# Patient Record
Sex: FEMALE | Race: WHITE | NOT HISPANIC OR LATINO
[De-identification: names, ages, dates, MRNs, and addresses within clinical notes are randomized per-mention and may not be internally consistent; named-entity substitution may affect disease eponyms.]

---

## 2017-01-16 ENCOUNTER — FORM ENCOUNTER (OUTPATIENT)
Age: 82
End: 2017-01-16

## 2017-01-17 ENCOUNTER — APPOINTMENT (OUTPATIENT)
Dept: MAMMOGRAPHY | Facility: HOSPITAL | Age: 82
End: 2017-01-17

## 2017-01-17 ENCOUNTER — OUTPATIENT (OUTPATIENT)
Dept: OUTPATIENT SERVICES | Facility: HOSPITAL | Age: 82
LOS: 1 days | End: 2017-01-17
Payer: MEDICARE

## 2017-01-17 ENCOUNTER — APPOINTMENT (OUTPATIENT)
Dept: ULTRASOUND IMAGING | Facility: HOSPITAL | Age: 82
End: 2017-01-17

## 2017-01-17 DIAGNOSIS — Z12.31 ENCOUNTER FOR SCREENING MAMMOGRAM FOR MALIGNANT NEOPLASM OF BREAST: ICD-10-CM

## 2017-01-17 DIAGNOSIS — R92.2 INCONCLUSIVE MAMMOGRAM: ICD-10-CM

## 2017-01-17 PROCEDURE — 77063 BREAST TOMOSYNTHESIS BI: CPT

## 2017-01-17 PROCEDURE — 76641 ULTRASOUND BREAST COMPLETE: CPT

## 2017-01-17 PROCEDURE — 77067 SCR MAMMO BI INCL CAD: CPT

## 2017-01-18 ENCOUNTER — OTHER (OUTPATIENT)
Age: 82
End: 2017-01-18

## 2017-01-27 ENCOUNTER — RECORD ABSTRACTING (OUTPATIENT)
Age: 82
End: 2017-01-27

## 2017-01-27 DIAGNOSIS — Z92.89 PERSONAL HISTORY OF OTHER MEDICAL TREATMENT: ICD-10-CM

## 2017-01-28 ENCOUNTER — LABORATORY RESULT (OUTPATIENT)
Age: 82
End: 2017-01-28

## 2017-02-06 ENCOUNTER — APPOINTMENT (OUTPATIENT)
Dept: HEMATOLOGY ONCOLOGY | Facility: CLINIC | Age: 82
End: 2017-02-06

## 2017-02-06 VITALS
WEIGHT: 147.31 LBS | HEART RATE: 72 BPM | SYSTOLIC BLOOD PRESSURE: 112 MMHG | TEMPERATURE: 97.3 F | BODY MASS INDEX: 25.29 KG/M2 | DIASTOLIC BLOOD PRESSURE: 60 MMHG

## 2017-02-06 DIAGNOSIS — Z12.4 ENCOUNTER FOR SCREENING FOR MALIGNANT NEOPLASM OF CERVIX: ICD-10-CM

## 2017-09-25 ENCOUNTER — TRANSCRIPTION ENCOUNTER (OUTPATIENT)
Age: 82
End: 2017-09-25

## 2018-02-03 ENCOUNTER — LABORATORY RESULT (OUTPATIENT)
Age: 83
End: 2018-02-03

## 2018-02-05 ENCOUNTER — OTHER (OUTPATIENT)
Age: 83
End: 2018-02-05

## 2018-02-08 ENCOUNTER — APPOINTMENT (OUTPATIENT)
Dept: HEMATOLOGY ONCOLOGY | Facility: CLINIC | Age: 83
End: 2018-02-08
Payer: MEDICARE

## 2018-02-08 VITALS
WEIGHT: 146 LBS | SYSTOLIC BLOOD PRESSURE: 90 MMHG | TEMPERATURE: 98.3 F | HEART RATE: 76 BPM | BODY MASS INDEX: 25.06 KG/M2 | DIASTOLIC BLOOD PRESSURE: 58 MMHG

## 2018-02-08 DIAGNOSIS — C43.9 MALIGNANT MELANOMA OF SKIN, UNSPECIFIED: ICD-10-CM

## 2018-02-08 DIAGNOSIS — E83.52 HYPERCALCEMIA: ICD-10-CM

## 2018-02-08 PROCEDURE — 99214 OFFICE O/P EST MOD 30 MIN: CPT

## 2018-02-08 RX ORDER — PANTOPRAZOLE 40 MG/1
40 TABLET, DELAYED RELEASE ORAL
Qty: 90 | Refills: 0 | Status: ACTIVE | COMMUNITY
Start: 2018-01-05

## 2018-02-12 ENCOUNTER — OTHER (OUTPATIENT)
Age: 83
End: 2018-02-12

## 2018-04-25 ENCOUNTER — FORM ENCOUNTER (OUTPATIENT)
Age: 83
End: 2018-04-25

## 2018-04-26 ENCOUNTER — APPOINTMENT (OUTPATIENT)
Dept: MAMMOGRAPHY | Facility: HOSPITAL | Age: 83
End: 2018-04-26
Payer: MEDICARE

## 2018-04-26 ENCOUNTER — APPOINTMENT (OUTPATIENT)
Dept: ULTRASOUND IMAGING | Facility: HOSPITAL | Age: 83
End: 2018-04-26
Payer: MEDICARE

## 2018-04-26 ENCOUNTER — OUTPATIENT (OUTPATIENT)
Dept: OUTPATIENT SERVICES | Facility: HOSPITAL | Age: 83
LOS: 1 days | End: 2018-04-26
Payer: MEDICARE

## 2018-04-26 DIAGNOSIS — Z00.8 ENCOUNTER FOR OTHER GENERAL EXAMINATION: ICD-10-CM

## 2018-04-26 PROCEDURE — 77067 SCR MAMMO BI INCL CAD: CPT

## 2018-04-26 PROCEDURE — 76641 ULTRASOUND BREAST COMPLETE: CPT | Mod: 26

## 2018-04-26 PROCEDURE — 77067 SCR MAMMO BI INCL CAD: CPT | Mod: 26

## 2018-04-26 PROCEDURE — 77063 BREAST TOMOSYNTHESIS BI: CPT | Mod: 26

## 2018-04-26 PROCEDURE — 77063 BREAST TOMOSYNTHESIS BI: CPT

## 2018-04-26 PROCEDURE — 76641 ULTRASOUND BREAST COMPLETE: CPT

## 2018-04-30 DIAGNOSIS — R92.2 INCONCLUSIVE MAMMOGRAM: ICD-10-CM

## 2018-04-30 DIAGNOSIS — R92.1 MAMMOGRAPHIC CALCIFICATION FOUND ON DIAGNOSTIC IMAGING OF BREAST: ICD-10-CM

## 2018-04-30 DIAGNOSIS — Z12.31 ENCOUNTER FOR SCREENING MAMMOGRAM FOR MALIGNANT NEOPLASM OF BREAST: ICD-10-CM

## 2018-08-17 ENCOUNTER — OUTPATIENT (OUTPATIENT)
Dept: OUTPATIENT SERVICES | Facility: HOSPITAL | Age: 83
LOS: 1 days | End: 2018-08-17
Payer: MEDICARE

## 2018-08-17 ENCOUNTER — APPOINTMENT (OUTPATIENT)
Dept: RADIOLOGY | Facility: HOSPITAL | Age: 83
End: 2018-08-17

## 2018-08-17 DIAGNOSIS — Z00.8 ENCOUNTER FOR OTHER GENERAL EXAMINATION: ICD-10-CM

## 2018-08-17 PROCEDURE — 73070 X-RAY EXAM OF ELBOW: CPT

## 2018-08-17 PROCEDURE — 73070 X-RAY EXAM OF ELBOW: CPT | Mod: 26,RT

## 2018-08-17 PROCEDURE — 73030 X-RAY EXAM OF SHOULDER: CPT | Mod: 26,RT

## 2018-08-17 PROCEDURE — 73030 X-RAY EXAM OF SHOULDER: CPT

## 2018-08-21 ENCOUNTER — APPOINTMENT (OUTPATIENT)
Dept: ORTHOPEDIC SURGERY | Facility: CLINIC | Age: 83
End: 2018-08-21
Payer: MEDICARE

## 2018-08-21 VITALS — HEIGHT: 64 IN | WEIGHT: 146 LBS | BODY MASS INDEX: 24.92 KG/M2

## 2018-08-21 DIAGNOSIS — M25.819 OTHER SPECIFIED JOINT DISORDERS, UNSPECIFIED SHOULDER: ICD-10-CM

## 2018-08-21 DIAGNOSIS — M65.841 OTHER SYNOVITIS AND TENOSYNOVITIS, RIGHT HAND: ICD-10-CM

## 2018-08-21 PROCEDURE — 99214 OFFICE O/P EST MOD 30 MIN: CPT | Mod: 25

## 2018-08-21 PROCEDURE — 20610 DRAIN/INJ JOINT/BURSA W/O US: CPT | Mod: RT

## 2018-08-22 PROBLEM — M65.841 EXTENSOR TENOSYNOVITIS OF RIGHT WRIST: Status: ACTIVE | Noted: 2018-08-22

## 2018-08-22 PROBLEM — M25.819 SUBCORACOID IMPINGEMENT OF RIGHT SHOULDER: Status: ACTIVE | Noted: 2018-08-22

## 2019-03-25 ENCOUNTER — APPOINTMENT (OUTPATIENT)
Dept: HEMATOLOGY ONCOLOGY | Facility: CLINIC | Age: 84
End: 2019-03-25

## 2019-05-22 ENCOUNTER — APPOINTMENT (OUTPATIENT)
Dept: ULTRASOUND IMAGING | Facility: HOSPITAL | Age: 84
End: 2019-05-22

## 2019-06-06 ENCOUNTER — OUTPATIENT (OUTPATIENT)
Dept: OUTPATIENT SERVICES | Facility: HOSPITAL | Age: 84
LOS: 1 days | End: 2019-06-06
Payer: MEDICARE

## 2019-06-06 ENCOUNTER — APPOINTMENT (OUTPATIENT)
Dept: ULTRASOUND IMAGING | Facility: HOSPITAL | Age: 84
End: 2019-06-06
Payer: MEDICARE

## 2019-06-06 ENCOUNTER — APPOINTMENT (OUTPATIENT)
Dept: MAMMOGRAPHY | Facility: HOSPITAL | Age: 84
End: 2019-06-06
Payer: MEDICARE

## 2019-06-06 DIAGNOSIS — Z00.8 ENCOUNTER FOR OTHER GENERAL EXAMINATION: ICD-10-CM

## 2019-06-06 PROCEDURE — 76641 ULTRASOUND BREAST COMPLETE: CPT | Mod: 26,50

## 2019-06-06 PROCEDURE — 77067 SCR MAMMO BI INCL CAD: CPT | Mod: 26

## 2019-06-06 PROCEDURE — 77067 SCR MAMMO BI INCL CAD: CPT

## 2019-06-06 PROCEDURE — 76641 ULTRASOUND BREAST COMPLETE: CPT

## 2019-06-06 PROCEDURE — 77063 BREAST TOMOSYNTHESIS BI: CPT | Mod: 26

## 2019-06-06 PROCEDURE — 77063 BREAST TOMOSYNTHESIS BI: CPT

## 2019-06-25 ENCOUNTER — EMERGENCY (EMERGENCY)
Facility: HOSPITAL | Age: 84
LOS: 1 days | Discharge: HOME | End: 2019-06-25
Attending: EMERGENCY MEDICINE | Admitting: EMERGENCY MEDICINE
Payer: MEDICARE

## 2019-06-25 VITALS
HEIGHT: 64 IN | TEMPERATURE: 98 F | OXYGEN SATURATION: 96 % | WEIGHT: 145.06 LBS | HEART RATE: 82 BPM | DIASTOLIC BLOOD PRESSURE: 88 MMHG | SYSTOLIC BLOOD PRESSURE: 165 MMHG | RESPIRATION RATE: 16 BRPM

## 2019-06-25 PROCEDURE — 99283 EMERGENCY DEPT VISIT LOW MDM: CPT

## 2019-06-25 RX ORDER — METOPROLOL TARTRATE 50 MG
25 TABLET ORAL ONCE
Refills: 0 | Status: DISCONTINUED | OUTPATIENT
Start: 2019-06-25 | End: 2019-06-25

## 2019-06-25 NOTE — ED ADULT NURSE REASSESSMENT NOTE - NS ED NURSE REASSESS COMMENT FT1
Pt states she "forgot to take my Blood Pressure medication this morning that's why my pressure is a little high but I will take it when I get home" Pt refuses to take toprol xl at this time.

## 2019-06-25 NOTE — ED PROVIDER NOTE - OBJECTIVE STATEMENT
Patient presents with report of burning sensation with poss rash to right shoulder and upper back, to where she applied a CBD coconut oil Patient presents with report of burning sensation with poss rash to right shoulder and upper back, to where she applied a CBD coconut oil with hemp extract. She has used it once before without a reaction.   She wiped off the substance when she noted the feeling of a reaction and took 1 tab of benadryl. She is improved.   Notes that she did not take her BP medication this morning.   No chest pain, sob, vomiting, throat tightness or wheeze.    She has calcific tendonitis and usually gets intraarticular or trigger pt injections by ortho. Last one was 1 year ago by  Dr Garay. The drugist at the pharmacy advised her to try this oil.

## 2019-06-25 NOTE — ED PROVIDER NOTE - CARE PROVIDER_API CALL
Marco Smith (DO)  Internal Medicine  [address], [address]  [place], [place] [id number]  Phone: [phone]  Fax: [phone]  Follow Up Time:

## 2019-06-25 NOTE — ED ADULT NURSE NOTE - CHPI ED NUR SYMPTOMS NEG
no wheezing/no difficulty breathing/no rash/no nausea/no shortness of breath/no swelling of face, tongue/no difficulty swallowing/no vomiting/no throat itching/no congestion

## 2019-06-25 NOTE — ED ADULT NURSE NOTE - OBJECTIVE STATEMENT
Pt brought to ER with complaints of "Burning after I massaged CBD oil on my shoulder, It didn't happen the last time I used it so I became nervous".

## 2019-06-25 NOTE — ED PROVIDER NOTE - CLINICAL SUMMARY MEDICAL DECISION MAKING FREE TEXT BOX
Pt reportedly had an allergic reaction. No findings in the ED. Pt understands and will f/u or return prn concerns.

## 2019-08-14 PROBLEM — I10 ESSENTIAL (PRIMARY) HYPERTENSION: Chronic | Status: ACTIVE | Noted: 2019-06-25

## 2019-08-27 ENCOUNTER — APPOINTMENT (OUTPATIENT)
Dept: FAMILY MEDICINE | Facility: CLINIC | Age: 84
End: 2019-08-27

## 2019-10-31 ENCOUNTER — TRANSCRIPTION ENCOUNTER (OUTPATIENT)
Age: 84
End: 2019-10-31

## 2019-11-12 ENCOUNTER — APPOINTMENT (OUTPATIENT)
Dept: FAMILY MEDICINE | Facility: CLINIC | Age: 84
End: 2019-11-12

## 2020-07-30 ENCOUNTER — APPOINTMENT (OUTPATIENT)
Dept: RADIOLOGY | Facility: HOSPITAL | Age: 85
End: 2020-07-30

## 2020-08-03 ENCOUNTER — EMERGENCY (EMERGENCY)
Facility: HOSPITAL | Age: 85
LOS: 1 days | Discharge: HOME | End: 2020-08-03
Attending: EMERGENCY MEDICINE | Admitting: EMERGENCY MEDICINE
Payer: MEDICARE

## 2020-08-03 VITALS
HEART RATE: 88 BPM | DIASTOLIC BLOOD PRESSURE: 89 MMHG | TEMPERATURE: 98 F | HEIGHT: 64 IN | OXYGEN SATURATION: 96 % | SYSTOLIC BLOOD PRESSURE: 163 MMHG | WEIGHT: 139.99 LBS | RESPIRATION RATE: 17 BRPM

## 2020-08-03 DIAGNOSIS — R04.0 EPISTAXIS: ICD-10-CM

## 2020-08-03 PROCEDURE — 99283 EMERGENCY DEPT VISIT LOW MDM: CPT

## 2020-08-03 PROCEDURE — 99282 EMERGENCY DEPT VISIT SF MDM: CPT

## 2020-08-03 NOTE — ED PROVIDER NOTE - NSFOLLOWUPINSTRUCTIONS_ED_ALL_ED_FT
Follow up with ENT as scheduled for tomorrow.   Return to the ED if any worsening or persistent symptoms.       Nosebleed    WHAT YOU NEED TO KNOW:    A nosebleed, or epistaxis, occurs when one or more of the blood vessels in your nose break. You may have dark or bright red blood from one or both nostrils. A nosebleed is most commonly caused by dry air or picking your nose. A direct blow to your nose, irritation from a cold or allergies, or a foreign object can also cause a nosebleed.     DISCHARGE INSTRUCTIONS:    Return to the emergency department if:     Your nasal packing is soaked with blood.      Your nose is still bleeding after 20 minutes, even after you pinch it.       You have a foul-smelling discharge coming out of your nose.      You feel so weak and dizzy that you have trouble standing up.      You have trouble breathing or talking.     Contact your healthcare provider if:     You have a fever and are vomiting.      You have pain in and around your nose that is getting worse even after you take pain medicines.      Your nasal pack is loose.      You have questions or concerns about your condition or care.    First aid:     Sit up and lean forward. This will help prevent you from swallowing blood. Spit blood and saliva into a bowl.       Apply pressure to your nose. Use 2 fingers to pinch your nose shut for 10       Apply ice on the bridge of your nose to decrease swelling and bleeding. Use a cold pack or put crushed ice in a plastic bag. Cover it with a towel to protect your skin.      Pack your nose with a cotton ball, tissue, tampon, or gauze bandage to stop the bleeding.    Medicines:     Medicines applied to a small piece of cotton and placed in your nose. Medicine may also be sprayed in or applied directly to your nose. You may need medicine to prevent an infection. If bleeding is severe, medicine may be injected into a blood vessel in your nose.       Take your medicine as directed. Contact your healthcare provider if you think your medicine is not helping or if you have side effects. Tell him of her if you are allergic to any medicine. Keep a list of the medicines, vitamins, and herbs you take. Include the amounts, and when and why you take them. Bring the list or the pill bottles to follow-up visits. Carry your medicine list with you in case of an emergency.    Prevent another nosebleed:     Keep your nose moist. Put a small amount of petroleum jelly inside your nostrils as needed. Use a saline (saltwater) nasal spray. Do not put anything else inside your nose unless your healthcare provider says it is okay. Do not use oil-based lubricants if you use oxygen therapy. They may be flammable.      Use a cool mist humidifier to increase air moisture in your home. This will help your nose stay moist.       Do not pick or blow your nose for at least a week. You can irritate or damage your nose if you pick it. Blowing your nose too hard may cause the bleeding to start again. Do not bend over or strain as this can cause the bleeding to start again.      Avoid irritants such as tobacco smoke or chemical sprays such as .    Follow up with your healthcare provider as directed: Any packing in your nose should be removed within 2 to 3 days. Write down your questions so you remember to ask them during your visits.

## 2020-08-03 NOTE — ED ADULT NURSE NOTE - OBJECTIVE STATEMENT
Pt w/ nosebleeds x3 days, each day lasting 4-5 minutes, resolves with pressure application. Pt takes motrin x2 weeks for chronic knee pain and aspirin 81mg daily preventatively. PMH knee surgery, melanoma removal. Allergy to sulfa drugs.

## 2020-08-03 NOTE — ED PROVIDER NOTE - ATTENDING CONTRIBUTION TO CARE
pt c/o nosebleed tonight from both nostrils, mostly from L, spontaneous. stopped after 4 mins pressure. had 2 other brief episodes in last 2 days. no trauma. no dizziness, cp,sob. no rhinorrhea, fever. no other abnormal bleeding.     exam:   General: well appearing, NAD.   HEENT: eyes perrl, nose: no active bleeding. no hematoma, mild edematous turbinate L nasal cavity. no definite bleeding source identified.  cor: RRR, s1s2, 2+rad pulses.   lungs: ctabl, no resp distress.   neuro: a&ox3, cn2-12 intact, CAST, 5/5 strength c nl sensation all extremities, nl coordination.   MSK: no extremity swelling.  Skin: normal, no rash    AP: epistaxis, 3 brief episodes. HDS. no s/s signif blood loss. counseled on treatment, care for rebleed. has f/u c ENT

## 2020-08-03 NOTE — ED PROVIDER NOTE - CLINICAL SUMMARY MEDICAL DECISION MAKING FREE TEXT BOX
86 yr old female with hx of HTN presents with nose bleed starting at 7 pm tonight, lasting for 4 minutes. Pt reports 2 more episodes in the last 2 days. Pt denies any chest pain, sob, dizziness, or any other symptoms. Pt takes asa daily but did not take it today. Pt also reports taking motrin daily for the last 2 weeks for arthritic knee pain. Pt has appointment with ENT tomorrow.    on exam- no bleeding noted. lungs clear. normal exam   pt stable for dc and fu with ENT tomorrow as scheduled.

## 2020-08-03 NOTE — ED PROVIDER NOTE - OBJECTIVE STATEMENT
86 yr old female with hx of HTN presents with nose bleed starting at 7 pm tonight, lasting for 4 minutes. Pt reports 2 more episodes in the last 2 days. Pt denies any chest pain, sob, dizziness, or any other symptoms. Pt takes asa daily but did not take it today. Pt also reports taking motrin daily for the last 2 weeks for arthritic knee pain. Pt has appointment with ENT tomorrow.

## 2020-08-03 NOTE — ED PROVIDER NOTE - PATIENT PORTAL LINK FT
You can access the FollowMyHealth Patient Portal offered by [facility] by registering at the following website: [email or web address]. By joining [employer]’s FollowMyHealth portal, you will also be able to view your health information using other applications (apps) compatible with our system.

## 2020-08-06 ENCOUNTER — EMERGENCY (EMERGENCY)
Facility: HOSPITAL | Age: 85
LOS: 1 days | Discharge: HOME | End: 2020-08-06
Attending: INTERNAL MEDICINE | Admitting: INTERNAL MEDICINE
Payer: MEDICARE

## 2020-08-06 VITALS
SYSTOLIC BLOOD PRESSURE: 160 MMHG | OXYGEN SATURATION: 98 % | HEIGHT: 64 IN | HEART RATE: 95 BPM | RESPIRATION RATE: 15 BRPM | DIASTOLIC BLOOD PRESSURE: 92 MMHG | TEMPERATURE: 99 F | WEIGHT: 145.06 LBS

## 2020-08-06 VITALS
RESPIRATION RATE: 16 BRPM | HEART RATE: 88 BPM | OXYGEN SATURATION: 98 % | DIASTOLIC BLOOD PRESSURE: 79 MMHG | SYSTOLIC BLOOD PRESSURE: 142 MMHG

## 2020-08-06 DIAGNOSIS — R04.0 EPISTAXIS: ICD-10-CM

## 2020-08-06 PROCEDURE — 99282 EMERGENCY DEPT VISIT SF MDM: CPT

## 2020-08-06 PROCEDURE — 99283 EMERGENCY DEPT VISIT LOW MDM: CPT

## 2020-08-06 RX ORDER — OXYMETAZOLINE HYDROCHLORIDE 0.5 MG/ML
1 SPRAY NASAL ONCE
Refills: 0 | Status: COMPLETED | OUTPATIENT
Start: 2020-08-06 | End: 2020-08-06

## 2020-08-06 RX ADMIN — OXYMETAZOLINE HYDROCHLORIDE 1 SPRAY(S): 0.5 SPRAY NASAL at 18:55

## 2020-08-06 NOTE — ED PROVIDER NOTE - OBJECTIVE STATEMENT
87 y/o F seen by Dr. cabrera (ENT) today and had left nostril cauterized with packing returns to ED for "dripping pink blood" 87 y/o F with h/o HTN seen by Dr. Matthews (ENT) today and had left nostril cauterized with packing returns to ED for "dripping pink blood". Was told to remove packing tonight before bed. Patient states she is nervous to do this by herself. Denies headache, fever, chills, nausea, vomiting. No active bleeding on exam.

## 2020-08-06 NOTE — ED PROVIDER NOTE - ATTENDING CONTRIBUTION TO CARE
85 y/o F with h/o HTN seen by Dr. Matthews (ENT) today and had left nostril cauterized with packing returns to ED for "dripping pink blood". Was told to remove packing tonight before bed. Patient states she is nervous to do this by herself. Denies headache, fever, chills, nausea, vomiting. Mild light spotting noted within nose once packing removed- Afrin/surgicel placed. ENT follow up  Dr. Pandya:  I have reviewed and discussed with the PA/ resident the case specifics, including the history, physical assessment, evaluation, conclusion, laboratory results, and medical plan. I agree with the contents, and conclusions. I have personally examined, and interviewed the patient.

## 2020-08-06 NOTE — ED PROVIDER NOTE - PATIENT PORTAL LINK FT
You can access the FollowMyHealth Patient Portal offered by [facility] by registering at the following website: [email or web address]. By joining [employer]’s FollowMyHealth portal, you will also be able to view your health information using other applications (apps) compatible with our system.

## 2020-08-06 NOTE — ED PROVIDER NOTE - CLINICAL SUMMARY MEDICAL DECISION MAKING FREE TEXT BOX
87 y/o F with h/o HTN seen by Dr. Matthews (ENT) today and had left nostril cauterized with packing returns to ED for "dripping pink blood". Was told to remove packing tonight before bed. Patient states she is nervous to do this by herself. Denies headache, fever, chills, nausea, vomiting. Mild light spotting noted within nose once packing removed- Afrin/surgicel placed. ENT follow up

## 2020-08-06 NOTE — ED PROVIDER NOTE - NSFOLLOWUPINSTRUCTIONS_ED_ALL_ED_FT
Follow up with your ENT within the week.  Take all of your medications as previously prescribed   Take Tylenol 650mg every 4-6 hours as needed for pain   No nasal blowing.   Worsening, continued or ANY new concerning symptoms return to the emergency department.     Nosebleed     A nosebleed is when blood comes out of the nose. Nosebleeds are common. They are usually not a sign of a serious medical problem.  Follow these instructions at home:  When you have a nosebleed:     Sit down.Tilt your head a little forward.Follow these steps:  Pinch your nose with a clean towel or tissue.Keep pinching your nose for 10 minutes. Do not let go.After 10 minutes, let go of your nose.If there is still bleeding, do these steps again. Keep doing these steps until the bleeding stops.Do not put things in your nose to stop the bleeding.Try not to lie down or put your head back.Use a nose spray decongestant as told by your doctor.Do not use petroleum jelly or mineral oil in your nose. These things can get into your lungs.After a nosebleed:     Try not to blow your nose or sniffle for several hours.Try not to strain, lift, or bend at the waist for several days.Use saline spray or a humidifier as told by your doctor.Aspirin and blood-thinning medicines make bleeding more likely. If you take these medicines, ask your doctor if you should stop taking them, or if you should change how much you take. Do not stop taking the medicine unless your doctor tells you to.Contact a doctor if:  You have a fever.You get nosebleeds often.You are getting nosebleeds more often than usual.You bruise very easily.You have something stuck in your nose.You have bleeding in your mouth.You throw up (vomit) or cough up brown material.You get a nosebleed after you start a new medicine.Get help right away if:  You have a nosebleed after you fall or hurt your head.Your nosebleed does not go away after 20 minutes.You feel dizzy or weak.You have unusual bleeding from other parts of your body.You have unusual bruising on other parts of your body.You get sweaty.You throw up blood.Summary  Nosebleeds are common. They are usually not a sign of a serious medical problem.When you have a nosebleed, sit down and tilt your head a little forward. Pinch your nose with a clean tissue.After the bleeding stops, try not to blow your nose or sniffle for several hours. Follow up with your ENT within the week.  The Surgicel will dissolve.   No nasal blowing.   Take all of your medications as previously prescribed   Take Tylenol 650mg every 4-6 hours as needed for pain   Worsening, continued or ANY new concerning symptoms return to the emergency department.     Nosebleed     A nosebleed is when blood comes out of the nose. Nosebleeds are common. They are usually not a sign of a serious medical problem.  Follow these instructions at home:  When you have a nosebleed:     Sit down.Tilt your head a little forward.Follow these steps:  Pinch your nose with a clean towel or tissue.Keep pinching your nose for 10 minutes. Do not let go.After 10 minutes, let go of your nose.If there is still bleeding, do these steps again. Keep doing these steps until the bleeding stops.Do not put things in your nose to stop the bleeding.Try not to lie down or put your head back.Use a nose spray decongestant as told by your doctor.Do not use petroleum jelly or mineral oil in your nose. These things can get into your lungs.After a nosebleed:     Try not to blow your nose or sniffle for several hours.Try not to strain, lift, or bend at the waist for several days.Use saline spray or a humidifier as told by your doctor.Aspirin and blood-thinning medicines make bleeding more likely. If you take these medicines, ask your doctor if you should stop taking them, or if you should change how much you take. Do not stop taking the medicine unless your doctor tells you to.Contact a doctor if:  You have a fever.You get nosebleeds often.You are getting nosebleeds more often than usual.You bruise very easily.You have something stuck in your nose.You have bleeding in your mouth.You throw up (vomit) or cough up brown material.You get a nosebleed after you start a new medicine.Get help right away if:  You have a nosebleed after you fall or hurt your head.Your nosebleed does not go away after 20 minutes.You feel dizzy or weak.You have unusual bleeding from other parts of your body.You have unusual bruising on other parts of your body.You get sweaty.You throw up blood.Summary  Nosebleeds are common. They are usually not a sign of a serious medical problem.When you have a nosebleed, sit down and tilt your head a little forward. Pinch your nose with a clean tissue.After the bleeding stops, try not to blow your nose or sniffle for several hours.

## 2020-09-13 ENCOUNTER — EMERGENCY (EMERGENCY)
Facility: HOSPITAL | Age: 85
LOS: 1 days | Discharge: HOME | End: 2020-09-13
Attending: EMERGENCY MEDICINE | Admitting: EMERGENCY MEDICINE
Payer: MEDICARE

## 2020-09-13 VITALS
HEIGHT: 64 IN | WEIGHT: 138.01 LBS | OXYGEN SATURATION: 99 % | TEMPERATURE: 98 F | RESPIRATION RATE: 17 BRPM | DIASTOLIC BLOOD PRESSURE: 100 MMHG | HEART RATE: 83 BPM | SYSTOLIC BLOOD PRESSURE: 174 MMHG

## 2020-09-13 DIAGNOSIS — R04.0 EPISTAXIS: ICD-10-CM

## 2020-09-13 DIAGNOSIS — F43.22 ADJUSTMENT DISORDER WITH ANXIETY: ICD-10-CM

## 2020-09-13 LAB
ALBUMIN SERPL ELPH-MCNC: 3.9 G/DL — SIGNIFICANT CHANGE UP (ref 3.3–5)
ALP SERPL-CCNC: 57 U/L — SIGNIFICANT CHANGE UP (ref 40–120)
ALT FLD-CCNC: 26 U/L — SIGNIFICANT CHANGE UP (ref 10–45)
AMPHET UR-MCNC: NEGATIVE — SIGNIFICANT CHANGE UP
ANION GAP SERPL CALC-SCNC: 8 MMOL/L — SIGNIFICANT CHANGE UP (ref 5–17)
APAP SERPL-MCNC: <1 UG/ML — LOW (ref 10–30)
APPEARANCE UR: CLEAR — SIGNIFICANT CHANGE UP
AST SERPL-CCNC: 19 U/L — SIGNIFICANT CHANGE UP (ref 10–40)
BACTERIA # UR AUTO: ABNORMAL /HPF
BARBITURATES UR SCN-MCNC: NEGATIVE — SIGNIFICANT CHANGE UP
BASOPHILS # BLD AUTO: 0.03 K/UL — SIGNIFICANT CHANGE UP (ref 0–0.2)
BASOPHILS NFR BLD AUTO: 0.5 % — SIGNIFICANT CHANGE UP (ref 0–2)
BENZODIAZ UR-MCNC: NEGATIVE — SIGNIFICANT CHANGE UP
BILIRUB SERPL-MCNC: 0.5 MG/DL — SIGNIFICANT CHANGE UP (ref 0.2–1.2)
BILIRUB UR-MCNC: NEGATIVE — SIGNIFICANT CHANGE UP
BUN SERPL-MCNC: 7 MG/DL — SIGNIFICANT CHANGE UP (ref 7–23)
CALCIUM SERPL-MCNC: 10.2 MG/DL — SIGNIFICANT CHANGE UP (ref 8.4–10.5)
CHLORIDE SERPL-SCNC: 99 MMOL/L — SIGNIFICANT CHANGE UP (ref 96–108)
CO2 SERPL-SCNC: 29 MMOL/L — SIGNIFICANT CHANGE UP (ref 22–31)
COCAINE METAB.OTHER UR-MCNC: NEGATIVE — SIGNIFICANT CHANGE UP
COLOR SPEC: YELLOW — SIGNIFICANT CHANGE UP
CREAT SERPL-MCNC: 0.58 MG/DL — SIGNIFICANT CHANGE UP (ref 0.5–1.3)
DIFF PNL FLD: ABNORMAL
EOSINOPHIL # BLD AUTO: 0.04 K/UL — SIGNIFICANT CHANGE UP (ref 0–0.5)
EOSINOPHIL NFR BLD AUTO: 0.6 % — SIGNIFICANT CHANGE UP (ref 0–6)
EPI CELLS # UR: SIGNIFICANT CHANGE UP
ETHANOL SERPL-MCNC: <3 MG/DL — SIGNIFICANT CHANGE UP (ref 0–3)
GLUCOSE SERPL-MCNC: 109 MG/DL — HIGH (ref 70–99)
GLUCOSE UR QL: NEGATIVE — SIGNIFICANT CHANGE UP
HCT VFR BLD CALC: 37.6 % — SIGNIFICANT CHANGE UP (ref 34.5–45)
HGB BLD-MCNC: 12.6 G/DL — SIGNIFICANT CHANGE UP (ref 11.5–15.5)
IMM GRANULOCYTES NFR BLD AUTO: 0.2 % — SIGNIFICANT CHANGE UP (ref 0–1.5)
KETONES UR-MCNC: NEGATIVE — SIGNIFICANT CHANGE UP
LEUKOCYTE ESTERASE UR-ACNC: ABNORMAL
LYMPHOCYTES # BLD AUTO: 1.52 K/UL — SIGNIFICANT CHANGE UP (ref 1–3.3)
LYMPHOCYTES # BLD AUTO: 24.7 % — SIGNIFICANT CHANGE UP (ref 13–44)
MCHC RBC-ENTMCNC: 29 PG — SIGNIFICANT CHANGE UP (ref 27–34)
MCHC RBC-ENTMCNC: 33.5 GM/DL — SIGNIFICANT CHANGE UP (ref 32–36)
MCV RBC AUTO: 86.4 FL — SIGNIFICANT CHANGE UP (ref 80–100)
METHADONE UR-MCNC: NEGATIVE — SIGNIFICANT CHANGE UP
MONOCYTES # BLD AUTO: 0.38 K/UL — SIGNIFICANT CHANGE UP (ref 0–0.9)
MONOCYTES NFR BLD AUTO: 6.2 % — SIGNIFICANT CHANGE UP (ref 2–14)
NEUTROPHILS # BLD AUTO: 4.18 K/UL — SIGNIFICANT CHANGE UP (ref 1.8–7.4)
NEUTROPHILS NFR BLD AUTO: 67.8 % — SIGNIFICANT CHANGE UP (ref 43–77)
NITRITE UR-MCNC: NEGATIVE — SIGNIFICANT CHANGE UP
NRBC # BLD: 0 /100 WBCS — SIGNIFICANT CHANGE UP (ref 0–0)
OPIATES UR-MCNC: NEGATIVE — SIGNIFICANT CHANGE UP
PCP SPEC-MCNC: SIGNIFICANT CHANGE UP
PCP UR-MCNC: NEGATIVE — SIGNIFICANT CHANGE UP
PH UR: 8 — SIGNIFICANT CHANGE UP (ref 5–8)
PLATELET # BLD AUTO: 312 K/UL — SIGNIFICANT CHANGE UP (ref 150–400)
POTASSIUM SERPL-MCNC: 3.7 MMOL/L — SIGNIFICANT CHANGE UP (ref 3.5–5.3)
POTASSIUM SERPL-SCNC: 3.7 MMOL/L — SIGNIFICANT CHANGE UP (ref 3.5–5.3)
PROT SERPL-MCNC: 7.1 G/DL — SIGNIFICANT CHANGE UP (ref 6–8.3)
PROT UR-MCNC: NEGATIVE — SIGNIFICANT CHANGE UP
RBC # BLD: 4.35 M/UL — SIGNIFICANT CHANGE UP (ref 3.8–5.2)
RBC # FLD: 14 % — SIGNIFICANT CHANGE UP (ref 10.3–14.5)
RBC CASTS # UR COMP ASSIST: SIGNIFICANT CHANGE UP /HPF (ref 0–4)
SALICYLATES SERPL-MCNC: 0.3 MG/DL — LOW (ref 3–30)
SARS-COV-2 IGG SERPL QL IA: NEGATIVE — SIGNIFICANT CHANGE UP
SARS-COV-2 IGM SERPL IA-ACNC: 0.08 INDEX — SIGNIFICANT CHANGE UP
SARS-COV-2 RNA SPEC QL NAA+PROBE: SIGNIFICANT CHANGE UP
SODIUM SERPL-SCNC: 136 MMOL/L — SIGNIFICANT CHANGE UP (ref 135–145)
SP GR SPEC: 1.01 — SIGNIFICANT CHANGE UP (ref 1.01–1.02)
THC UR QL: NEGATIVE — SIGNIFICANT CHANGE UP
TSH SERPL-MCNC: 0.57 UIU/ML — SIGNIFICANT CHANGE UP (ref 0.27–4.2)
UROBILINOGEN FLD QL: NEGATIVE — SIGNIFICANT CHANGE UP
WBC # BLD: 6.16 K/UL — SIGNIFICANT CHANGE UP (ref 3.8–10.5)
WBC # FLD AUTO: 6.16 K/UL — SIGNIFICANT CHANGE UP (ref 3.8–10.5)
WBC UR QL: SIGNIFICANT CHANGE UP /HPF (ref 0–5)

## 2020-09-13 PROCEDURE — 80307 DRUG TEST PRSMV CHEM ANLYZR: CPT

## 2020-09-13 PROCEDURE — 36415 COLL VENOUS BLD VENIPUNCTURE: CPT

## 2020-09-13 PROCEDURE — 30901 CONTROL OF NOSEBLEED: CPT | Mod: LT

## 2020-09-13 PROCEDURE — 99285 EMERGENCY DEPT VISIT HI MDM: CPT | Mod: 25

## 2020-09-13 PROCEDURE — 84443 ASSAY THYROID STIM HORMONE: CPT

## 2020-09-13 PROCEDURE — 93005 ELECTROCARDIOGRAM TRACING: CPT

## 2020-09-13 PROCEDURE — 86769 SARS-COV-2 COVID-19 ANTIBODY: CPT

## 2020-09-13 PROCEDURE — 80053 COMPREHEN METABOLIC PANEL: CPT

## 2020-09-13 PROCEDURE — 85025 COMPLETE CBC W/AUTO DIFF WBC: CPT

## 2020-09-13 PROCEDURE — 81001 URINALYSIS AUTO W/SCOPE: CPT

## 2020-09-13 PROCEDURE — 90792 PSYCH DIAG EVAL W/MED SRVCS: CPT | Mod: GC,95

## 2020-09-13 PROCEDURE — 93010 ELECTROCARDIOGRAM REPORT: CPT

## 2020-09-13 PROCEDURE — U0003: CPT

## 2020-09-13 PROCEDURE — 30901 CONTROL OF NOSEBLEED: CPT

## 2020-09-13 PROCEDURE — 99284 EMERGENCY DEPT VISIT MOD MDM: CPT | Mod: 25

## 2020-09-13 NOTE — ED PROVIDER NOTE - PROGRESS NOTE DETAILS
Pt reports to Dr. Fraire, " I am depressed and want to die". psych labs will be ordered and tele psych will be called. Pt cleared by psych for discharge home. Will fax over resources for pt for discharge. Pt reports to Dr. Fraire, " I am depressed and want to die". psych labs will be ordered and tele psych will be called.; dr weller: upon evaluation pt states she wants to die, denies hi, denies plan, lives alone, will call telepsych

## 2020-09-13 NOTE — ED PROVIDER NOTE - CLINICAL SUMMARY MEDICAL DECISION MAKING FREE TEXT BOX
86 yr old female with hx of HTN presents with 2 episodes of epistaxis today last about 5 minutes each. Pt reports seen about month ago by ENT and was cauterized. Pt takes asa daily but did not take it today. Denies any fever, chills, chest pain, sob, dizziness, or any other symptoms. No active bleeding.   left nostril - blood clot removed, no active bleeding, cauterized area, pt tolerated with no complaints. pt stable for dc and fu with ENT and pmd 86 yr old female with hx of HTN presents with 2 episodes of epistaxis today last about 5 minutes each. Pt reports seen about month ago by ENT and was cauterized. Pt takes asa daily but did not take it today. Denies any fever, chills, chest pain, sob, dizziness, or any other symptoms. No active bleeding.   left nostril - blood clot removed, no active bleeding, cauterized area, pt tolerated with no complaints.  pt reported to Dr. Fraire- that she was depressed and want to die. psych labs sent, psych seen pt and no intervention needed, will give pt info for resources in the area, pt clear to be discharged home by psych.

## 2020-09-13 NOTE — ED BEHAVIORAL HEALTH ASSESSMENT NOTE - HPI (INCLUDE ILLNESS QUALITY, SEVERITY, DURATION, TIMING, CONTEXT, MODIFYING FACTORS, ASSOCIATED SIGNS AND SYMPTOMS)
Petra Lo is an 87 year old , retired female, living in a private residence in [place], with three adult children, has previously seen a therapist in the context of a divorce many years ago, otherwise has no formal psychiatric history, including no prior hospitalizations, suicide attempts, substance abuse, legal history, or aggression, past medical history of hypertension and GERD, and no known drug allergies, who presented to [facility] ED with epistaxis, and while in the ED made a suicidal comment, prompting this evaluation.    On assessment, patient is cooperative, appears somewhat anxious, states she would like to go home. Acknowledges that she made a suicidal statement to ED staff, however reports this was made out of frustration due to ongoing and multiple recent stressors, predominantly frequent epistaxis, resulting in multiple ED visits, some nagging, consistent knee pain, and disengagement from social circles due to COVID-19 pandemic. States she is typically involved with a senior citizen lunch, exercise group, and various book clubs and Confucianist functions, and is frustrated she has been unable to attend/engage in these things due to the pandemic. States she currently spends her days fixing things around the house, reading, watching television shows she enjoys, and continues to enjoy these things as she did previously. reports ongoing early and middle insomnia, states this has been a chronic problem for many years. Otherwise feels well rested during the day, denies feeling overtly depressed, able to complete tasks and attend to ADLs independently. Denies feelings of hopelessness or worthlessness. Denies current SI/HI. Identifies wishing to return to prior activities, her adult children and sister, and supportive Confucianist community as positive things in her life. States she feels somewhat anxious about ongoing issues with her car and appliances malfunctioning at home, otherwise denies anxiety outside of the setting. Denies panic attacks, denies psychosis, including visual and auditory hallucinations, paranoia, and ideas of reference. Denies current or prior symptoms of jes, including decreased need for sleep, irritability, euphoria, or pressured speech. Denies substance abuse. Denies access to firearms.    Patient son, Christopher Lo ([phone]) provides the following collateral –  states Ms. Lo has been to the emergency room several times over the last month for epistaxis, And she and her family are frustrated that this keeps happening. He states patient has been calling more frequently with concerns about the house and her car. She reports they have sought an intake for therapy, but have been unable to successfully complete this due to the majority of providers doing virtual intakes at this time. He otherwise has no safety concerns and feels patient may return home. Petra Lo is an 87 year old , retired female, living in a private residence in [place], with three adult children, has previously seen a therapist in the context of a divorce many years ago, otherwise has no formal psychiatric history, including no prior hospitalizations, suicide attempts, substance abuse, legal history, or aggression, past medical history of hypertension and GERD, and no known drug allergies, who presented to [place] ED with epistaxis, and while in the ED made a suicidal comment, prompting this evaluation.    On assessment, patient is cooperative, appears somewhat anxious, states she would like to go home. Acknowledges that she made a suicidal statement to ED staff, however reports this was made out of frustration due to ongoing and multiple recent stressors, predominantly frequent epistaxis, resulting in multiple ED visits, some nagging, consistent knee pain, and disengagement from social circles due to COVID-19 pandemic. States she is typically involved with a senior citizen lunch, exercise group, and various book clubs and Scientologist functions, and is frustrated she has been unable to attend/engage in these things due to the pandemic. States she currently spends her days fixing things around the house, reading, watching television shows she enjoys, and continues to enjoy these things as she did previously. reports ongoing early and middle insomnia, states this has been a chronic problem for many years. Otherwise feels well rested during the day, denies feeling overtly depressed, able to complete tasks and attend to ADLs independently. Denies feelings of hopelessness or worthlessness. Denies current SI/HI. Identifies wishing to return to prior activities, her adult children and sister, and supportive Scientologist community as positive things in her life. States she feels somewhat anxious about ongoing issues with her car and appliances malfunctioning at home, otherwise denies anxiety outside of the setting. Denies panic attacks, denies psychosis, including visual and auditory hallucinations, paranoia, and ideas of reference. Denies current or prior symptoms of jes, including decreased need for sleep, irritability, euphoria, or pressured speech. Denies substance abuse. Denies access to firearms.    Patient son, Christopher Lo ([phone]) provides the following collateral –  states Ms. Lo has been to the emergency room several times over the last month for epistaxis, And she and her family are frustrated that this keeps happening. He states patient has been calling more frequently with concerns about the house and her car. She reports they have sought an intake for therapy, but have been unable to successfully complete this due to the majority of providers doing virtual intakes at this time. He otherwise has no safety concerns and feels patient may return home.    COVID Exposure Screen-  Collateral     *In the past 14 days, has the patient been around anyone with a positive COVID-19 test?*   (  ) Yes   ( x  ) No   (  ) Unknown- Reason (e.g. collateral uncertain, refusing to answer, etc.):  ______  *Has the patient been out of [place] within the past 14 days?*  (  ) Yes   ( x  ) No   (  ) Unknown- Reason (e.g. collateral uncertain, refusing to answer, etc.): _______

## 2020-09-13 NOTE — ED PROVIDER NOTE - ATTENDING CONTRIBUTION TO CARE
Dr. Fraire: I performed a face to face bedside interview with patient regarding history of present illness, review of symptoms and past medical history. I completed an independent physical exam.  I have discussed patient's plan of care with PA.   I agree with note as stated above, having amended the EMR as needed to reflect my findings.   This includes HISTORY OF PRESENT ILLNESS, HIV, PAST MEDICAL/SURGICAL/FAMILY/SOCIAL HISTORY, ALLERGIES AND HOME MEDICATIONS, REVIEW OF SYSTEMS, PHYSICAL EXAM, and any PROGRESS NOTES during the time I functioned as the attending physician for this patient.    dr fraire: 86 yr old female with hx of HTN presents with 2 episodes of epistaxis today last about 5 minutes each. Pt reports seen about month ago by ENT and was cauterized. Pt takes asa daily but did not take it today. Denies any fever, chills, chest pain, sob, dizziness, or any other symptoms. No active bleeding.   left nostril - blood clot removed, no active bleeding, cauterized area, pt tolerated with no complaints.  pt reported to Dr. Fraire- that she was depressed and want to die. psych labs sent, psych seen pt and no intervention needed, will give pt info for resources in the area, pt clear to be discharged home by psych.

## 2020-09-13 NOTE — ED ADULT NURSE REASSESSMENT NOTE - NS ED NURSE REASSESS COMMENT FT1
Patient given behavioral health resources provided by Tele-psych. Patient was educated to utilize resources if needed or any issues of mental health. Patient agree. Denies any SI or HI at this time.

## 2020-09-13 NOTE — ED BEHAVIORAL HEALTH ASSESSMENT NOTE - SUMMARY
87-year-old female, currently living in a private residence in [facility], with prior engagement in therapy many years ago, otherwise no formal psychiatric history, and past medical history of hypertension, GERD, and recent episodes of epistaxis (including one that prompted current ED visit), presenting with some anxiety and chronic insomnia in the setting of ongoing stressors related to the COVID-19 pandemic. Patient denies SI on this assessment, and states suicidal comment was made out of frustration regarding stressors noted above. Patient's son provided collateral noted above, and he has no acute safety concerns regarding patient. Patient is not deemed to be at an acutely elevated risk of harm, and is appropriate for outpatient follow-up. Resources will be provided as such, and patient will be discharged home.

## 2020-09-13 NOTE — ED BEHAVIORAL HEALTH ASSESSMENT NOTE - SUICIDE PROTECTIVE FACTORS
Identifies reasons for living/Fear of death or the actual act of killing self/Positive therapeutic relationships/Has future plans/Mormonism beliefs/Responsibility to family and others/Cultural, spiritual and/or moral attitudes against suicide/Supportive social network of family or friends

## 2020-09-13 NOTE — ED BEHAVIORAL HEALTH ASSESSMENT NOTE - OTHER PAST PSYCHIATRIC HISTORY (INCLUDE DETAILS REGARDING ONSET, COURSE OF ILLNESS, INPATIENT/OUTPATIENT TREATMENT)
Saw therapist many years ago the setting of divorce, otherwise no admissions, suicide attempts,  or med trials

## 2020-09-13 NOTE — ED BEHAVIORAL HEALTH ASSESSMENT NOTE - CASE SUMMARY
case seen via telepsychiatry, and discussed with dr patterson  pt is a  87 year old , retired female, living in a private residence in [place], with three adult children, has previously seen a therapist in the context of a divorce many years ago, otherwise has no formal psychiatric history, including no prior hospitalizations, suicide attempts, substance abuse, legal history, or aggression, past medical history of hypertension and GERD, and no known drug allergies, who presented to [facility] ED with epistaxis, and while in the ED made a suicidal comment, prompting this evaluation.  on evaluation pt is experiencing adjustment disorder type symptoms to stressors, there is no suicidality or severe depression, son has no safety concerns, pt is not an acute danger to self or others and is fit for discharge and outpatient referrals.

## 2020-09-13 NOTE — ED BEHAVIORAL HEALTH ASSESSMENT NOTE - DESCRIPTION
Calm, cooperative, no restraints or PRNs administered    T(C): 36.4 (09-13-20 @ 13:17), Max: 36.4 (09-13-20 @ 13:17)  HR: 83 (09-13-20 @ 13:17) (83 - 83)  BP: 174/100 (09-13-20 @ 13:17) (174/100 - 174/100)  RR: 17 (09-13-20 @ 13:17) (17 - 17)  SpO2: 99% (09-13-20 @ 13:17) (99% - 99%)  Wt(kg): --    COVID Exposure Screen-     *In the past 14 days, has the patient been around anyone with a positive COVID-19 test?*   (  ) Yes   ( x  ) No   (  ) Unknown- Reason (e.g. collateral uncertain, refusing to answer, etc.):  ______  *Has the patient been out of [place] within the past 14 days?*  (  ) Yes   ( x  ) No   (  ) Unknown- Reason (e.g. collateral uncertain, refusing to answer, etc.): _______ hypertension, GERD, recent epistaxis , has sister who lives nearby, three adult children who she sees frequently, engaged in social and [employer] events Calm, cooperative, no restraints or PRNs administered    T(C): 36.4 (09-13-20 @ 13:17), Max: 36.4 (09-13-20 @ 13:17)  HR: 83 (09-13-20 @ 13:17) (83 - 83)  BP: 174/100 (09-13-20 @ 13:17) (174/100 - 174/100)  RR: 17 (09-13-20 @ 13:17) (17 - 17)  SpO2: 99% (09-13-20 @ 13:17) (99% - 99%)  Wt(kg): --    COVID Exposure Screen- Patient     *In the past 14 days, has the patient been around anyone with a positive COVID-19 test?*   (  ) Yes   ( x  ) No   (  ) Unknown- Reason (e.g. collateral uncertain, refusing to answer, etc.):  ______  *Has the patient been out of [place] within the past 14 days?*  (  ) Yes   ( x  ) No   (  ) Unknown- Reason (e.g. collateral uncertain, refusing to answer, etc.): _______

## 2020-09-13 NOTE — ED PROVIDER NOTE - OBJECTIVE STATEMENT
86 yr old female with hx of HTN presents with 2 episodes of epistaxis today last about 5 minutes each. Pt reports seen about month ago by ENT and was cauterized. Pt takes asa daily but did not take it today. Denies any fever, chills, chest pain, sob, dizziness, or any other symptoms. No active bleeding.

## 2020-09-13 NOTE — ED PROVIDER NOTE - NSFOLLOWUPINSTRUCTIONS_ED_ALL_ED_FT
Follow up with your pmd within 48 hours   Follow up with your ENT   Return if any worsening or persistent symptoms.     Nosebleed    WHAT YOU NEED TO KNOW:    A nosebleed, or epistaxis, occurs when one or more of the blood vessels in your nose break. You may have dark or bright red blood from one or both nostrils. A nosebleed is most commonly caused by dry air or picking your nose. A direct blow to your nose, irritation from a cold or allergies, or a foreign object can also cause a nosebleed.     DISCHARGE INSTRUCTIONS:    Return to the emergency department if:   •Your nasal packing is soaked with blood.      •Your nose is still bleeding after 20 minutes, even after you pinch it.       •You have a foul-smelling discharge coming out of your nose.      •You feel so weak and dizzy that you have trouble standing up.      •You have trouble breathing or talking.       Contact your healthcare provider if:   •You have a fever and are vomiting.      •You have pain in and around your nose that is getting worse even after you take pain medicines.      •Your nasal pack is loose.      •You have questions or concerns about your condition or care.      First aid:   •Sit up and lean forward. This will help prevent you from swallowing blood. Spit blood and saliva into a bowl.       •Apply pressure to your nose. Use 2 fingers to pinch your nose shut for 10       •Apply ice on the bridge of your nose to decrease swelling and bleeding. Use a cold pack or put crushed ice in a plastic bag. Cover it with a towel to protect your skin.      •Pack your nose with a cotton ball, tissue, tampon, or gauze bandage to stop the bleeding.      Medicines:   •Medicines applied to a small piece of cotton and placed in your nose. Medicine may also be sprayed in or applied directly to your nose. You may need medicine to prevent an infection. If bleeding is severe, medicine may be injected into a blood vessel in your nose.       •Take your medicine as directed. Contact your healthcare provider if you think your medicine is not helping or if you have side effects. Tell him of her if you are allergic to any medicine. Keep a list of the medicines, vitamins, and herbs you take. Include the amounts, and when and why you take them. Bring the list or the pill bottles to follow-up visits. Carry your medicine list with you in case of an emergency.      Prevent another nosebleed:   •Keep your nose moist. Put a small amount of petroleum jelly inside your nostrils as needed. Use a saline (saltwater) nasal spray. Do not put anything else inside your nose unless your healthcare provider says it is okay. Do not use oil-based lubricants if you use oxygen therapy. They may be flammable.      •Use a cool mist humidifier to increase air moisture in your home. This will help your nose stay moist.       •Do not pick or blow your nose for at least a week. You can irritate or damage your nose if you pick it. Blowing your nose too hard may cause the bleeding to start again. Do not bend over or strain as this can cause the bleeding to start again.      •Avoid irritants such as tobacco smoke or chemical sprays such as .      Follow up with your healthcare provider as directed: Any packing in your nose should be removed within 2 to 3 days. Write down your questions so you remember to ask them during your visits.

## 2020-09-13 NOTE — ED PROVIDER NOTE - PATIENT PORTAL LINK FT
You can access the FollowMyHealth Patient Portal offered by [facility] by registering at the following website: [email or web address]. By joining [employer]’s FollowMyHealth portal, you will also be able to view your health information using other applications (apps) compatible with our system. You can access the FollowMyHealth Patient Portal offered by [facility] by registering at the following website: [email or web address]. By joining [email or web address]’s FollowMyHealth portal, you will also be able to view your health information using other applications (apps) compatible with our system.

## 2020-09-13 NOTE — ED BEHAVIORAL HEALTH ASSESSMENT NOTE - RISK ASSESSMENT
Unmodifiable risk factors: potential prior mood d/o. Modifiable risk factors: ongoing anxiety sx/adjustment d/o. Protective/mitigating factors: denies current SIIP/HIIP, no h/o SA/SIB, no h/o psych admissions, no access to firearms, no active substance abuse, good physical health, no psychosis, stable housing, social/familial supports, Mandaeism beliefs, help-seeking behavior. Given the above risk factors and mitigating protective factors, patient is at a low risk of self-harm, and does not require inpatient hospitalization at this time. Low Acute Suicide Risk

## 2020-09-13 NOTE — ED ADULT TRIAGE NOTE - RESPIRATORY RATE (BREATHS/MIN)
62 year old male with PMH HTN, dyslipidemia, ESRD on HD, DM, CAD s/p CABG, CKD3 presented with dyspnea, fever and chills.   T101.2, WBC 12.6, Lact 2.2, SCr 1.98, INR 2.63 at admission      Multifocal pneumonia with associated sepsis present upon admission. Now afebrile, normoxic.  CT chest with bilateral infiltrates RUL, RML and BRETT. Sepsis resolved, pneumonia clinically improving  - Continue IV antibiotics  - Continue Supplemental O2, Nebs PRN        Enterococcus fecalis bacteremia. TTE without any vegetations CT abdomen with diarrhea in sigmoids as well as gastric thickening (which corresponds with patients history of GERD). Repeat culture  results negative.  - Vancomycin changed to Ampicillin as sensitive  - ID follow up      CKD4, with MENDY (likely ATN), likely secondary to sepsis, diuretic, diarrhea  - Avoid  nephrotoxin; Lasix discontinued by Nephrology earlier today  - Monitor SCr    AF, rate controlled, INR therapeutic  - Continue BB  - Coumadin  5mg tonight    DMT2, A1c 9.1. Hyperglycemia  - Continue BGM and Sliding scale Insulins  - Lantus - increased to 20 Units  - Premeal Insulins 4U added    Diarrhea, likely antibiotic associated  - monitor BM, Lytes  - Continue Probiotics  - Consider further work up if persistent    GERD  - Continue PPI    Gout  - Continue Allopurinol    Dyslipidemia  - Continue Statin    Prophylactic measure  - INR therapeutic for VTEP  - Florastor for C. diff    Disposition - pending clinical improvement and resolution of bacteremia. ID recommendations.  Anticipate back to [facility] in next 48 hours; may need midline 62 year old male with PMH HTN, dyslipidemia, ESRD on HD, DM, CAD s/p CABG, CKD3 presented with dyspnea, fever and chills.   T101.2, WBC 12.6, Lact 2.2, SCr 1.98, INR 2.63 at admission      Multifocal pneumonia with associated sepsis present upon admission. Now afebrile, normoxic.  CT chest with bilateral infiltrates RUL, RML and BRETT. Sepsis resolved, pneumonia clinically improving  - Continue IV antibiotics  - Continue Supplemental O2, Nebs PRN        Enterococcus fecalis bacteremia. TTE without any vegetations CT abdomen with diarrhea in sigmoids as well as gastric thickening (which corresponds with patients history of GERD). Repeat culture  results negative.  - Vancomycin changed to Ampicillin as sensitive  - ID follow up      CKD4, with MENDY (likely ATN), likely secondary to sepsis, diuretic, diarrhea  - Avoid  nephrotoxin; Lasix discontinued by Nephrology earlier today  - Monitor SCr    AF, rate controlled, INR therapeutic  - Continue BB  - Coumadin  5mg tonight    DMT2, A1c 9.1. Hyperglycemia  - Continue BGM and Sliding scale Insulins  - Lantus - increased to 20 Units  - Premeal Insulins 4U added    Diarrhea, likely antibiotic associated  - monitor BM, Lytes  - Continue Probiotics  - Consider further work up if persistent    GERD  - Continue PPI    Gout  - Continue Allopurinol    Dyslipidemia  - Continue Statin    Prophylactic measure  - INR therapeutic for VTEP  - Florastor for C. diff    Disposition - pending clinical improvement of MENDY  and ID recommendations.  Anticipate back to [facility] in next 48 hours; may need midline 17

## 2020-09-13 NOTE — ED PROVIDER NOTE - PHYSICAL EXAMINATION
Gen:  alert, awake, no acute distress  Head:  atraumatic, normocephalic  HEENT: PERRLA, EOMI, normal nose, normal oropharynx, no tonsillar edema, erythema, or exudate  CV:  rrr, nl S1, S2, no m/r/g  Pulm:  lungs CTA b/l  Abd: s/nt/nd, +BS  MSK:  moving all extremities, no back midline ttp, no stepoffs, no cva TTP  Neuro:  grossly intact, no focal deficits  Skin:  clear, dry, intact  Psych: AOx3, normal affect, +si, no hi, no hallucinations

## 2020-09-13 NOTE — ED BEHAVIORAL HEALTH ASSESSMENT NOTE - THOUGHT CONTENT
Injectable Influenza Immunization Documentation    1.  Is the person to be vaccinated sick today?  No    2. Does the person to be vaccinated have an allergy to eggs or to a component of the vaccine?  No    3. Has the person to be vaccinated today ever had a serious reaction to influenza vaccine in the past?  No    4. Has the person to be vaccinated ever had Guillain-Littleton syndrome?  No     Form completed by Estephania COLE    
Unremarkable

## 2020-09-30 ENCOUNTER — OUTPATIENT (OUTPATIENT)
Dept: OUTPATIENT SERVICES | Facility: HOSPITAL | Age: 85
LOS: 1 days | Discharge: HOME | End: 2020-09-30
Payer: MEDICARE

## 2020-10-01 DIAGNOSIS — F33.9 MAJOR DEPRESSIVE DISORDER, RECURRENT, UNSPECIFIED: ICD-10-CM

## 2020-12-07 PROCEDURE — 99213 OFFICE O/P EST LOW 20 MIN: CPT

## 2020-12-26 ENCOUNTER — OUTPATIENT (OUTPATIENT)
Dept: OUTPATIENT SERVICES | Facility: HOSPITAL | Age: 85
LOS: 1 days | End: 2020-12-26
Payer: MEDICARE

## 2020-12-26 ENCOUNTER — APPOINTMENT (OUTPATIENT)
Dept: ULTRASOUND IMAGING | Facility: HOSPITAL | Age: 85
End: 2020-12-26
Payer: MEDICARE

## 2020-12-26 DIAGNOSIS — Z00.8 ENCOUNTER FOR OTHER GENERAL EXAMINATION: ICD-10-CM

## 2020-12-26 PROCEDURE — 93971 EXTREMITY STUDY: CPT | Mod: 26,RT

## 2020-12-26 PROCEDURE — 93971 EXTREMITY STUDY: CPT

## 2020-12-29 PROCEDURE — 99213 OFFICE O/P EST LOW 20 MIN: CPT

## 2021-01-28 PROCEDURE — 99213 OFFICE O/P EST LOW 20 MIN: CPT | Mod: 95

## 2021-02-10 ENCOUNTER — EMERGENCY (EMERGENCY)
Facility: HOSPITAL | Age: 86
LOS: 1 days | Discharge: HOME | End: 2021-02-10
Attending: EMERGENCY MEDICINE | Admitting: EMERGENCY MEDICINE
Payer: MEDICARE

## 2021-02-10 VITALS
DIASTOLIC BLOOD PRESSURE: 84 MMHG | TEMPERATURE: 97 F | WEIGHT: 119.93 LBS | HEART RATE: 109 BPM | RESPIRATION RATE: 16 BRPM | HEIGHT: 64 IN | SYSTOLIC BLOOD PRESSURE: 182 MMHG | OXYGEN SATURATION: 100 %

## 2021-02-10 VITALS
OXYGEN SATURATION: 100 % | HEART RATE: 98 BPM | RESPIRATION RATE: 16 BRPM | DIASTOLIC BLOOD PRESSURE: 82 MMHG | SYSTOLIC BLOOD PRESSURE: 174 MMHG

## 2021-02-10 LAB
ALBUMIN SERPL ELPH-MCNC: 3.3 G/DL — SIGNIFICANT CHANGE UP (ref 3.3–5)
ALP SERPL-CCNC: 83 U/L — SIGNIFICANT CHANGE UP (ref 40–120)
ALT FLD-CCNC: 20 U/L — SIGNIFICANT CHANGE UP (ref 10–45)
ANION GAP SERPL CALC-SCNC: 11 MMOL/L — SIGNIFICANT CHANGE UP (ref 5–17)
APTT BLD: 31.7 SEC — SIGNIFICANT CHANGE UP (ref 27.5–35.5)
AST SERPL-CCNC: 16 U/L — SIGNIFICANT CHANGE UP (ref 10–40)
BASOPHILS # BLD AUTO: 0.06 K/UL — SIGNIFICANT CHANGE UP (ref 0–0.2)
BASOPHILS NFR BLD AUTO: 0.7 % — SIGNIFICANT CHANGE UP (ref 0–2)
BILIRUB SERPL-MCNC: 0.2 MG/DL — SIGNIFICANT CHANGE UP (ref 0.2–1.2)
BUN SERPL-MCNC: 13 MG/DL — SIGNIFICANT CHANGE UP (ref 7–23)
CALCIUM SERPL-MCNC: 10.3 MG/DL — SIGNIFICANT CHANGE UP (ref 8.4–10.5)
CHLORIDE SERPL-SCNC: 102 MMOL/L — SIGNIFICANT CHANGE UP (ref 96–108)
CO2 SERPL-SCNC: 28 MMOL/L — SIGNIFICANT CHANGE UP (ref 22–31)
CREAT SERPL-MCNC: 0.65 MG/DL — SIGNIFICANT CHANGE UP (ref 0.5–1.3)
EOSINOPHIL # BLD AUTO: 0.03 K/UL — SIGNIFICANT CHANGE UP (ref 0–0.5)
EOSINOPHIL NFR BLD AUTO: 0.4 % — SIGNIFICANT CHANGE UP (ref 0–6)
GLUCOSE SERPL-MCNC: 116 MG/DL — HIGH (ref 70–99)
HCT VFR BLD CALC: 33.7 % — LOW (ref 34.5–45)
HGB BLD-MCNC: 10.9 G/DL — LOW (ref 11.5–15.5)
IMM GRANULOCYTES NFR BLD AUTO: 0.4 % — SIGNIFICANT CHANGE UP (ref 0–1.5)
INR BLD: 1.16 RATIO — SIGNIFICANT CHANGE UP (ref 0.88–1.16)
LYMPHOCYTES # BLD AUTO: 1.59 K/UL — SIGNIFICANT CHANGE UP (ref 1–3.3)
LYMPHOCYTES # BLD AUTO: 18.6 % — SIGNIFICANT CHANGE UP (ref 13–44)
MCHC RBC-ENTMCNC: 28 PG — SIGNIFICANT CHANGE UP (ref 27–34)
MCHC RBC-ENTMCNC: 32.3 GM/DL — SIGNIFICANT CHANGE UP (ref 32–36)
MCV RBC AUTO: 86.6 FL — SIGNIFICANT CHANGE UP (ref 80–100)
MONOCYTES # BLD AUTO: 0.6 K/UL — SIGNIFICANT CHANGE UP (ref 0–0.9)
MONOCYTES NFR BLD AUTO: 7 % — SIGNIFICANT CHANGE UP (ref 2–14)
NEUTROPHILS # BLD AUTO: 6.26 K/UL — SIGNIFICANT CHANGE UP (ref 1.8–7.4)
NEUTROPHILS NFR BLD AUTO: 72.9 % — SIGNIFICANT CHANGE UP (ref 43–77)
NRBC # BLD: 0 /100 WBCS — SIGNIFICANT CHANGE UP (ref 0–0)
PLATELET # BLD AUTO: 573 K/UL — HIGH (ref 150–400)
POTASSIUM SERPL-MCNC: 3.7 MMOL/L — SIGNIFICANT CHANGE UP (ref 3.5–5.3)
POTASSIUM SERPL-SCNC: 3.7 MMOL/L — SIGNIFICANT CHANGE UP (ref 3.5–5.3)
PROT SERPL-MCNC: 7.8 G/DL — SIGNIFICANT CHANGE UP (ref 6–8.3)
PROTHROM AB SERPL-ACNC: 13.9 SEC — HIGH (ref 10.6–13.6)
RBC # BLD: 3.89 M/UL — SIGNIFICANT CHANGE UP (ref 3.8–5.2)
RBC # FLD: 13.9 % — SIGNIFICANT CHANGE UP (ref 10.3–14.5)
SODIUM SERPL-SCNC: 141 MMOL/L — SIGNIFICANT CHANGE UP (ref 135–145)
WBC # BLD: 8.57 K/UL — SIGNIFICANT CHANGE UP (ref 3.8–10.5)
WBC # FLD AUTO: 8.57 K/UL — SIGNIFICANT CHANGE UP (ref 3.8–10.5)

## 2021-02-10 PROCEDURE — 73090 X-RAY EXAM OF FOREARM: CPT

## 2021-02-10 PROCEDURE — 36000 PLACE NEEDLE IN VEIN: CPT

## 2021-02-10 PROCEDURE — 85652 RBC SED RATE AUTOMATED: CPT

## 2021-02-10 PROCEDURE — 93971 EXTREMITY STUDY: CPT

## 2021-02-10 PROCEDURE — 73080 X-RAY EXAM OF ELBOW: CPT

## 2021-02-10 PROCEDURE — 73090 X-RAY EXAM OF FOREARM: CPT | Mod: 26,LT

## 2021-02-10 PROCEDURE — 85025 COMPLETE CBC W/AUTO DIFF WBC: CPT

## 2021-02-10 PROCEDURE — 73030 X-RAY EXAM OF SHOULDER: CPT

## 2021-02-10 PROCEDURE — 85610 PROTHROMBIN TIME: CPT

## 2021-02-10 PROCEDURE — 99284 EMERGENCY DEPT VISIT MOD MDM: CPT

## 2021-02-10 PROCEDURE — 85730 THROMBOPLASTIN TIME PARTIAL: CPT

## 2021-02-10 PROCEDURE — 36415 COLL VENOUS BLD VENIPUNCTURE: CPT

## 2021-02-10 PROCEDURE — 73080 X-RAY EXAM OF ELBOW: CPT | Mod: 26,LT

## 2021-02-10 PROCEDURE — 80053 COMPREHEN METABOLIC PANEL: CPT

## 2021-02-10 PROCEDURE — 73030 X-RAY EXAM OF SHOULDER: CPT | Mod: 26,LT

## 2021-02-10 PROCEDURE — 99284 EMERGENCY DEPT VISIT MOD MDM: CPT | Mod: 25

## 2021-02-10 PROCEDURE — 93971 EXTREMITY STUDY: CPT | Mod: 26,LT

## 2021-02-10 PROCEDURE — 86140 C-REACTIVE PROTEIN: CPT

## 2021-02-10 RX ORDER — ACETAMINOPHEN 500 MG
650 TABLET ORAL ONCE
Refills: 0 | Status: COMPLETED | OUTPATIENT
Start: 2021-02-10 | End: 2021-02-10

## 2021-02-10 RX ADMIN — Medication 1 TABLET(S): at 16:14

## 2021-02-10 RX ADMIN — Medication 650 MILLIGRAM(S): at 14:11

## 2021-02-10 NOTE — ED PROVIDER NOTE - PATIENT PORTAL LINK FT
You can access the FollowMyHealth Patient Portal offered by [facility] by registering at the following website: [email or web address]. By joining [employer]’s FollowMyHealth portal, you will also be able to view your health information using other applications (apps) compatible with our system.

## 2021-02-10 NOTE — ED PROVIDER NOTE - MUSCULOSKELETAL MINIMAL EXAM
+edema over dorsal surface of left proximal forearm, no olecranon bursitis, no bony ttp, normal active and passive ROM left elbow, +radial pulse, no crepitus, minimal ttp over left elbow erythema and edema/atraumatic/normal range of motion

## 2021-02-10 NOTE — ED ADULT NURSE NOTE - PMH
Gastroesophageal reflux disease, unspecified whether esophagitis present    Hypertension, unspecified type

## 2021-02-10 NOTE — ED PROVIDER NOTE - ATTENDING CONTRIBUTION TO CARE
Dr. Damico: I performed a face to face bedside interview with patient regarding history of present illness, review of symptoms and past medical history. I completed an independent physical exam.  I have discussed patient's plan of care with PA.   I agree with note as stated above, having amended the EMR as needed to reflect my findings.   This includes HISTORY OF PRESENT ILLNESS, HIV, PAST MEDICAL/SURGICAL/FAMILY/SOCIAL HISTORY, ALLERGIES AND HOME MEDICATIONS, REVIEW OF SYSTEMS, PHYSICAL EXAM, and any PROGRESS NOTES during the time I functioned as the attending physician for this patient.    Dr. Damico: This H&P has been written by myself in its entirety

## 2021-02-10 NOTE — ED ADULT NURSE NOTE - NS ED NURSE RECORD ANOTHER HT AND WT
Schedule CBC,CMP and Xgeva in 4 weeks  Schedule CBC,CMp, PET scan and see me in 8 weeks and for Xgeva
Yes

## 2021-02-10 NOTE — CHART NOTE - NSCHARTNOTEFT_GEN_A_CORE
Called to ED to assess patient for safe discharge. Arrived to ED to find patient fully dressed and ambulating around the room, pt is A&O X4. Pt states she lives alone however she has a friend that can help her if necessary. Pt states she also has a woman she pays from time to time to help her with errands. Explained to patient that aide services are not covered by insurance, patient understands. Private hire list offered to patient. Has a friend that will pick her up.

## 2021-02-10 NOTE — ED ADULT NURSE NOTE - OBJECTIVE STATEMENT
Patient reports left arm pain 2 weeks associated with redness and swelling. Denies chest pain, shortness of breath, syncope, weakness or dizziness.

## 2021-02-10 NOTE — ED PROVIDER NOTE - CARE PLAN
Principal Discharge DX:	Left arm pain   Principal Discharge DX:	Left arm pain  Secondary Diagnosis:	Cellulitis of arm, left

## 2021-02-10 NOTE — ED PROVIDER NOTE - CLINICAL SUMMARY MEDICAL DECISION MAKING FREE TEXT BOX
Pt with atraumatic left elbow pain with swelling and minimal redness, possible early cellulitis, septic joint unlikely, will get xray, US, reassess

## 2021-02-10 NOTE — ED PROVIDER NOTE - NSFOLLOWUPINSTRUCTIONS_ED_ALL_ED_FT
rest, elevate   take Tylenol 650 mg every 4 - 6 hours as needed for pain   Take Augmentin as prescribed   return to the ED if any worsening or persistent symptoms.       Cellulitis    WHAT YOU NEED TO KNOW:    Cellulitis is a skin infection caused by bacteria. Cellulitis is common and can become severe. Cellulitis usually appears on the lower legs. It can also appear on the arms, face, and other areas. Cellulitis develops when bacteria enter a crack or break in your skin, such as a scratch, bite, or cut.    Cellulitis          DISCHARGE INSTRUCTIONS:    Return to the emergency department if:   •Your wound gets larger and more painful.      •You feel a crackling under your skin when you touch it.      •You have purple dots or bumps on your skin, or you see bleeding under your skin.      •You see red streaks coming from the infected area.      Call your doctor if:   •The red, warm, swollen area gets larger.      •Your fever or pain does not go away or gets worse.      •The area does not get smaller after 3 days of antibiotics.      •You have questions or concerns about your condition or care.      Medicines: You should start to see improvement in 3 days. If cellulitis is not treated, the infection can spread through your body and become life-threatening. You may need any of the following medicines:  •Antibiotics help treat the bacterial infection.      •Acetaminophen decreases pain and fever. It is available without a doctor's order. Ask how much to take and how often to take it. Follow directions. Read the labels of all other medicines you are using to see if they also contain acetaminophen, or ask your doctor or pharmacist. Acetaminophen can cause liver damage if not taken correctly. Do not use more than 4 grams (4,000 milligrams) total of acetaminophen in one day.       •NSAIDs, such as ibuprofen, help decrease swelling, pain, and fever. This medicine is available with or without a doctor's order. NSAIDs can cause stomach bleeding or kidney problems in certain people. If you take blood thinner medicine, always ask your healthcare provider if NSAIDs are safe for you. Always read the medicine label and follow directions.      •Take your medicine as directed. Contact your healthcare provider if you think your medicine is not helping or if you have side effects. Tell him or her if you are allergic to any medicine. Keep a list of the medicines, vitamins, and herbs you take. Include the amounts, and when and why you take them. Bring the list or the pill bottles to follow-up visits. Carry your medicine list with you in case of an emergency.      Self-care:   •Wash the area with soap and water every day. Gently pat dry. Use bandages if directed by your healthcare provider.      •Elevate the area above the level of your heart as often as you can. This will help decrease swelling and pain. Prop the area on pillows or blankets to keep it elevated comfortably.  Elevate Leg           •Place a cool, damp cloth on the area. Use clean cloths and clean water. You can do this as often as you need to. Cool, damp cloths may help decrease pain.      •Apply cream or ointment as directed. These help protect the area. Most over-the-counter products, such as petroleum jelly, are good to use. Ask your healthcare provider about specific creams or ointments you should use.      Prevent cellulitis:   •Do not scratch bug bites or areas of injury. You increase your risk for cellulitis by scratching these areas.      •Do not share personal items, such as towels, clothing, and razors.      •Clean exercise equipment with germ-killing  before and after you use it.      •Treat athlete’s foot. This can help prevent the spread of a bacterial skin infection.      •Wash your hands often. Use soap and water. Wash your hands after you use the bathroom, change a child's diapers, or sneeze. Wash your hands before you prepare or eat food. Use lotion to prevent dry, cracked skin.  Handwashing           Follow up with your doctor within 3 days, or as directed: He or she will check if your cellulitis is getting better. Write down your questions so you remember to ask them during your visits.

## 2021-02-10 NOTE — ED PROVIDER NOTE - OBJECTIVE STATEMENT
Dr. Damico: 87F h/o HTN sent in by Dr. Keenan for 1 week of LUE pain, noticed redness and swelling, sent to r/o DVT vs cellulitis. Pt states she was reaching for something and felt a "twinge" in her left arm. Denies chest pain, sob, fevers, chills, nausea, vomiting, abdo pain. Pt lives by  herself, her sister lives in [place] but states she has a lot of friends around. Ambulates with a cane.

## 2021-02-11 LAB
CRP SERPL-MCNC: 9.91 MG/DL — HIGH (ref 0–0.4)
ERYTHROCYTE [SEDIMENTATION RATE] IN BLOOD: 92 MM/HR — HIGH (ref 0–20)

## 2021-02-22 PROCEDURE — 99213 OFFICE O/P EST LOW 20 MIN: CPT | Mod: 95

## 2021-03-22 PROCEDURE — 99214 OFFICE O/P EST MOD 30 MIN: CPT | Mod: 95

## 2021-04-20 PROCEDURE — 99214 OFFICE O/P EST MOD 30 MIN: CPT | Mod: 95

## 2021-05-21 PROCEDURE — 99214 OFFICE O/P EST MOD 30 MIN: CPT | Mod: 95

## 2021-06-24 PROCEDURE — 99214 OFFICE O/P EST MOD 30 MIN: CPT | Mod: 95

## 2021-08-11 PROCEDURE — 99214 OFFICE O/P EST MOD 30 MIN: CPT | Mod: 95

## 2021-09-14 PROBLEM — K21.9 GASTRO-ESOPHAGEAL REFLUX DISEASE WITHOUT ESOPHAGITIS: Chronic | Status: ACTIVE | Noted: 2021-02-10

## 2021-09-20 ENCOUNTER — OUTPATIENT (OUTPATIENT)
Dept: OUTPATIENT SERVICES | Facility: HOSPITAL | Age: 86
LOS: 1 days | End: 2021-09-20
Payer: MEDICARE

## 2021-09-20 DIAGNOSIS — Z20.828 CONTACT WITH AND (SUSPECTED) EXPOSURE TO OTHER VIRAL COMMUNICABLE DISEASES: ICD-10-CM

## 2021-09-20 LAB — SARS-COV-2 RNA SPEC QL NAA+PROBE: SIGNIFICANT CHANGE UP

## 2021-09-20 PROCEDURE — U0003: CPT

## 2021-09-20 PROCEDURE — U0005: CPT

## 2021-09-21 ENCOUNTER — TRANSCRIPTION ENCOUNTER (OUTPATIENT)
Age: 86
End: 2021-09-21

## 2021-09-21 NOTE — ASU PATIENT PROFILE, ADULT - NSICDXPASTSURGICALHX_GEN_ALL_CORE_FT
PAST SURGICAL HISTORY:  History of cholecystectomy     History of melanoma excision right lower extremity

## 2021-09-21 NOTE — ASU PATIENT PROFILE, ADULT - NSICDXPASTMEDICALHX_GEN_ALL_CORE_FT
PAST MEDICAL HISTORY:  Gastroesophageal reflux disease, unspecified whether esophagitis present     Hypertension, unspecified type

## 2021-09-22 ENCOUNTER — OUTPATIENT (OUTPATIENT)
Dept: OUTPATIENT SERVICES | Facility: HOSPITAL | Age: 86
LOS: 1 days | End: 2021-09-22
Payer: MEDICARE

## 2021-09-22 VITALS
RESPIRATION RATE: 16 BRPM | DIASTOLIC BLOOD PRESSURE: 80 MMHG | HEART RATE: 70 BPM | OXYGEN SATURATION: 99 % | TEMPERATURE: 97 F | SYSTOLIC BLOOD PRESSURE: 140 MMHG

## 2021-09-22 VITALS
TEMPERATURE: 98 F | RESPIRATION RATE: 15 BRPM | HEIGHT: 64 IN | WEIGHT: 139.99 LBS | SYSTOLIC BLOOD PRESSURE: 153 MMHG | DIASTOLIC BLOOD PRESSURE: 86 MMHG | HEART RATE: 84 BPM | OXYGEN SATURATION: 97 %

## 2021-09-22 DIAGNOSIS — H25.12 AGE-RELATED NUCLEAR CATARACT, LEFT EYE: ICD-10-CM

## 2021-09-22 DIAGNOSIS — Z98.890 OTHER SPECIFIED POSTPROCEDURAL STATES: Chronic | ICD-10-CM

## 2021-09-22 DIAGNOSIS — Z90.49 ACQUIRED ABSENCE OF OTHER SPECIFIED PARTS OF DIGESTIVE TRACT: Chronic | ICD-10-CM

## 2021-09-22 PROCEDURE — 66984 XCAPSL CTRC RMVL W/O ECP: CPT | Mod: LT

## 2021-09-22 PROCEDURE — V2632: CPT

## 2021-09-22 RX ORDER — KETOROLAC TROMETHAMINE 0.5 %
1 DROPS OPHTHALMIC (EYE)
Refills: 0 | Status: COMPLETED | OUTPATIENT
Start: 2021-09-22 | End: 2021-09-22

## 2021-09-22 RX ORDER — ACETAMINOPHEN 500 MG
650 TABLET ORAL ONCE
Refills: 0 | Status: DISCONTINUED | OUTPATIENT
Start: 2021-09-22 | End: 2021-10-06

## 2021-09-22 RX ORDER — METOPROLOL TARTRATE 50 MG
1 TABLET ORAL
Qty: 0 | Refills: 0 | DISCHARGE

## 2021-09-22 RX ORDER — SODIUM CHLORIDE 9 MG/ML
1000 INJECTION, SOLUTION INTRAVENOUS
Refills: 0 | Status: DISCONTINUED | OUTPATIENT
Start: 2021-09-22 | End: 2021-09-22

## 2021-09-22 RX ORDER — PHENYLEPHRINE HCL 2.5 %
1 DROPS OPHTHALMIC (EYE)
Refills: 0 | Status: COMPLETED | OUTPATIENT
Start: 2021-09-22 | End: 2021-09-22

## 2021-09-22 RX ORDER — CYCLOPENTOLATE HYDROCHLORIDE 10 MG/ML
1 SOLUTION/ DROPS OPHTHALMIC
Refills: 0 | Status: COMPLETED | OUTPATIENT
Start: 2021-09-22 | End: 2021-09-22

## 2021-09-22 RX ORDER — TROPICAMIDE 1 %
1 DROPS OPHTHALMIC (EYE)
Refills: 0 | Status: COMPLETED | OUTPATIENT
Start: 2021-09-22 | End: 2021-09-22

## 2021-09-22 RX ADMIN — Medication 1 DROP(S): at 09:10

## 2021-09-22 RX ADMIN — Medication 1 DROP(S): at 09:05

## 2021-09-22 RX ADMIN — SODIUM CHLORIDE 40 MILLILITER(S): 9 INJECTION, SOLUTION INTRAVENOUS at 09:09

## 2021-09-22 RX ADMIN — Medication 1 DROP(S): at 09:15

## 2021-09-22 RX ADMIN — CYCLOPENTOLATE HYDROCHLORIDE 1 DROP(S): 10 SOLUTION/ DROPS OPHTHALMIC at 09:15

## 2021-09-22 RX ADMIN — CYCLOPENTOLATE HYDROCHLORIDE 1 DROP(S): 10 SOLUTION/ DROPS OPHTHALMIC at 09:04

## 2021-09-22 RX ADMIN — CYCLOPENTOLATE HYDROCHLORIDE 1 DROP(S): 10 SOLUTION/ DROPS OPHTHALMIC at 09:09

## 2021-09-22 NOTE — ASU DISCHARGE PLAN (ADULT/PEDIATRIC) - NURSING INSTRUCTIONS
Prepared for discharge.  All questions answered. Patient meets all ASU criteria for discharge. Verbalized understanding of discharge instructions.

## 2021-09-22 NOTE — BRIEF OPERATIVE NOTE - NSICDXBRIEFPROCEDURE_GEN_ALL_CORE_FT
PROCEDURES:  Single stage extracapsular removal of cataract with insertion of intraocular lens prosthesis by phacoemulsification 22-Sep-2021 10:44:12  Amadou Carolina

## 2021-09-22 NOTE — ASU DISCHARGE PLAN (ADULT/PEDIATRIC) - ASU DC SPECIAL INSTRUCTIONSFT
Keep shield on eye until office visit today at 2:15 pm  Any problems call office at [phone]    OFFICE VISIT TODAY AT 2:15 pm  Bring Drops

## 2021-09-29 PROCEDURE — 99214 OFFICE O/P EST MOD 30 MIN: CPT | Mod: 95

## 2021-11-15 PROCEDURE — 99214 OFFICE O/P EST MOD 30 MIN: CPT | Mod: 95

## 2021-11-18 PROCEDURE — 90832 PSYTX W PT 30 MINUTES: CPT

## 2021-11-29 ENCOUNTER — OUTPATIENT (OUTPATIENT)
Dept: OUTPATIENT SERVICES | Facility: HOSPITAL | Age: 86
LOS: 1 days | End: 2021-11-29
Payer: MEDICARE

## 2021-11-29 DIAGNOSIS — Z98.890 OTHER SPECIFIED POSTPROCEDURAL STATES: Chronic | ICD-10-CM

## 2021-11-29 DIAGNOSIS — Z20.828 CONTACT WITH AND (SUSPECTED) EXPOSURE TO OTHER VIRAL COMMUNICABLE DISEASES: ICD-10-CM

## 2021-11-29 DIAGNOSIS — Z90.49 ACQUIRED ABSENCE OF OTHER SPECIFIED PARTS OF DIGESTIVE TRACT: Chronic | ICD-10-CM

## 2021-11-29 LAB — SARS-COV-2 RNA SPEC QL NAA+PROBE: SIGNIFICANT CHANGE UP

## 2021-11-29 PROCEDURE — U0005: CPT

## 2021-11-29 PROCEDURE — U0003: CPT

## 2021-11-29 PROCEDURE — 90832 PSYTX W PT 30 MINUTES: CPT | Mod: 95

## 2021-11-30 ENCOUNTER — TRANSCRIPTION ENCOUNTER (OUTPATIENT)
Age: 86
End: 2021-11-30

## 2021-11-30 NOTE — ASU PATIENT PROFILE, ADULT - FALL HARM RISK - RISK INTERVENTIONS

## 2021-11-30 NOTE — ASU PATIENT PROFILE, ADULT - NSICDXPASTSURGICALHX_GEN_ALL_CORE_FT
PAST SURGICAL HISTORY:  History of cholecystectomy     History of melanoma excision right lower extremity     PAST SURGICAL HISTORY:  Cataract extraction status of eye, left     History of cholecystectomy     History of melanoma excision right lower extremity

## 2021-12-01 ENCOUNTER — OUTPATIENT (OUTPATIENT)
Dept: OUTPATIENT SERVICES | Facility: HOSPITAL | Age: 86
LOS: 1 days | End: 2021-12-01
Payer: MEDICARE

## 2021-12-01 VITALS
TEMPERATURE: 97 F | HEIGHT: 64 IN | OXYGEN SATURATION: 99 % | WEIGHT: 139.99 LBS | DIASTOLIC BLOOD PRESSURE: 87 MMHG | SYSTOLIC BLOOD PRESSURE: 153 MMHG | HEART RATE: 81 BPM | RESPIRATION RATE: 16 BRPM

## 2021-12-01 VITALS
OXYGEN SATURATION: 96 % | HEART RATE: 75 BPM | TEMPERATURE: 97 F | DIASTOLIC BLOOD PRESSURE: 80 MMHG | RESPIRATION RATE: 14 BRPM | SYSTOLIC BLOOD PRESSURE: 140 MMHG

## 2021-12-01 DIAGNOSIS — Z98.890 OTHER SPECIFIED POSTPROCEDURAL STATES: Chronic | ICD-10-CM

## 2021-12-01 DIAGNOSIS — Z90.49 ACQUIRED ABSENCE OF OTHER SPECIFIED PARTS OF DIGESTIVE TRACT: Chronic | ICD-10-CM

## 2021-12-01 DIAGNOSIS — H25.10 AGE-RELATED NUCLEAR CATARACT, UNSPECIFIED EYE: ICD-10-CM

## 2021-12-01 DIAGNOSIS — Z98.42 CATARACT EXTRACTION STATUS, LEFT EYE: Chronic | ICD-10-CM

## 2021-12-01 PROCEDURE — 66984 XCAPSL CTRC RMVL W/O ECP: CPT | Mod: RT

## 2021-12-01 PROCEDURE — V2632: CPT

## 2021-12-01 RX ORDER — METOPROLOL TARTRATE 50 MG
1 TABLET ORAL
Qty: 0 | Refills: 0 | DISCHARGE

## 2021-12-01 RX ORDER — TROPICAMIDE 1 %
1 DROPS OPHTHALMIC (EYE)
Refills: 0 | Status: COMPLETED | OUTPATIENT
Start: 2021-12-01 | End: 2021-12-01

## 2021-12-01 RX ORDER — PHENYLEPHRINE HCL 2.5 %
1 DROPS OPHTHALMIC (EYE)
Refills: 0 | Status: COMPLETED | OUTPATIENT
Start: 2021-12-01 | End: 2021-12-01

## 2021-12-01 RX ORDER — ACETAMINOPHEN 500 MG
650 TABLET ORAL ONCE
Refills: 0 | Status: DISCONTINUED | OUTPATIENT
Start: 2021-12-01 | End: 2021-12-15

## 2021-12-01 RX ORDER — KETOROLAC TROMETHAMINE 0.5 %
1 DROPS OPHTHALMIC (EYE)
Refills: 0 | Status: COMPLETED | OUTPATIENT
Start: 2021-12-01 | End: 2021-12-01

## 2021-12-01 RX ORDER — SODIUM CHLORIDE 9 MG/ML
1000 INJECTION, SOLUTION INTRAVENOUS
Refills: 0 | Status: DISCONTINUED | OUTPATIENT
Start: 2021-12-01 | End: 2021-12-01

## 2021-12-01 RX ORDER — CHOLECALCIFEROL (VITAMIN D3) 125 MCG
1 CAPSULE ORAL
Qty: 0 | Refills: 0 | DISCHARGE

## 2021-12-01 RX ORDER — CYCLOPENTOLATE HYDROCHLORIDE 10 MG/ML
1 SOLUTION/ DROPS OPHTHALMIC
Refills: 0 | Status: COMPLETED | OUTPATIENT
Start: 2021-12-01 | End: 2021-12-01

## 2021-12-01 RX ADMIN — CYCLOPENTOLATE HYDROCHLORIDE 1 DROP(S): 10 SOLUTION/ DROPS OPHTHALMIC at 10:18

## 2021-12-01 RX ADMIN — Medication 1 DROP(S): at 10:03

## 2021-12-01 RX ADMIN — Medication 1 DROP(S): at 10:18

## 2021-12-01 RX ADMIN — CYCLOPENTOLATE HYDROCHLORIDE 1 DROP(S): 10 SOLUTION/ DROPS OPHTHALMIC at 10:03

## 2021-12-01 RX ADMIN — Medication 1 DROP(S): at 10:11

## 2021-12-01 RX ADMIN — SODIUM CHLORIDE 40 MILLILITER(S): 9 INJECTION, SOLUTION INTRAVENOUS at 10:18

## 2021-12-01 RX ADMIN — CYCLOPENTOLATE HYDROCHLORIDE 1 DROP(S): 10 SOLUTION/ DROPS OPHTHALMIC at 10:11

## 2021-12-01 NOTE — ASU DISCHARGE PLAN (ADULT/PEDIATRIC) - ASU DC SPECIAL INSTRUCTIONSFT
Keep shield on eye until office visit today at 2:15 pm  Any problems call office at [phone]    OFFICE VISIT TODAY AT 2:15 pm  Bring Drops

## 2021-12-01 NOTE — BRIEF OPERATIVE NOTE - NSICDXBRIEFPROCEDURE_GEN_ALL_CORE_FT
PROCEDURES:  Single stage extracapsular removal of cataract with insertion of intraocular lens prosthesis by phacoemulsification 01-Dec-2021 11:52:34  Amadou Carolina

## 2021-12-01 NOTE — ASU DISCHARGE PLAN (ADULT/PEDIATRIC) - NS MD DC FALL RISK RISK
For information on Fall & Injury Prevention, visit: [email or web address] OR  [email or web address] OR  https://www.cdc.gov/steadi/patient.html

## 2021-12-13 PROCEDURE — 90834 PSYTX W PT 45 MINUTES: CPT | Mod: 95

## 2022-01-10 PROCEDURE — 90834 PSYTX W PT 45 MINUTES: CPT | Mod: 95

## 2022-01-24 PROCEDURE — 90834 PSYTX W PT 45 MINUTES: CPT | Mod: 95

## 2022-01-25 PROCEDURE — 99214 OFFICE O/P EST MOD 30 MIN: CPT | Mod: 95

## 2022-02-07 PROCEDURE — 90834 PSYTX W PT 45 MINUTES: CPT | Mod: 95

## 2022-03-07 PROCEDURE — 90834 PSYTX W PT 45 MINUTES: CPT | Mod: 95

## 2022-03-08 PROCEDURE — 99214 OFFICE O/P EST MOD 30 MIN: CPT | Mod: 95

## 2022-04-04 PROCEDURE — 90834 PSYTX W PT 45 MINUTES: CPT | Mod: 95

## 2022-05-04 PROCEDURE — 99214 OFFICE O/P EST MOD 30 MIN: CPT | Mod: 95

## 2022-05-16 PROCEDURE — 90834 PSYTX W PT 45 MINUTES: CPT | Mod: 95

## 2022-05-21 NOTE — ED PROVIDER NOTE - SEVERITY
Patient discharged back to home. Provided AVS review teaching to patient with daughter at bedside. Daughter provided transport home. IV was pulled and pressure dressing applied. Provided teaching about the new medications and what times she should be taking them. Informed patient that she would be getting a call with information about a follow up appointment.    MILD

## 2022-06-02 PROCEDURE — 90832 PSYTX W PT 30 MINUTES: CPT | Mod: 95

## 2022-06-15 ENCOUNTER — OUTPATIENT (OUTPATIENT)
Dept: OUTPATIENT SERVICES | Facility: HOSPITAL | Age: 87
LOS: 1 days | End: 2022-06-15
Payer: MEDICARE

## 2022-06-15 ENCOUNTER — APPOINTMENT (OUTPATIENT)
Dept: ULTRASOUND IMAGING | Facility: HOSPITAL | Age: 87
End: 2022-06-15
Payer: MEDICARE

## 2022-06-15 DIAGNOSIS — Z00.8 ENCOUNTER FOR OTHER GENERAL EXAMINATION: ICD-10-CM

## 2022-06-15 DIAGNOSIS — Z90.49 ACQUIRED ABSENCE OF OTHER SPECIFIED PARTS OF DIGESTIVE TRACT: Chronic | ICD-10-CM

## 2022-06-15 DIAGNOSIS — Z98.890 OTHER SPECIFIED POSTPROCEDURAL STATES: Chronic | ICD-10-CM

## 2022-06-15 DIAGNOSIS — Z98.42 CATARACT EXTRACTION STATUS, LEFT EYE: Chronic | ICD-10-CM

## 2022-06-15 PROCEDURE — 93970 EXTREMITY STUDY: CPT

## 2022-06-15 PROCEDURE — 93970 EXTREMITY STUDY: CPT | Mod: 26

## 2022-06-15 PROCEDURE — 99214 OFFICE O/P EST MOD 30 MIN: CPT | Mod: 95

## 2022-06-30 PROCEDURE — 90834 PSYTX W PT 45 MINUTES: CPT | Mod: 95

## 2022-07-14 PROCEDURE — 90834 PSYTX W PT 45 MINUTES: CPT | Mod: 95

## 2022-08-01 PROCEDURE — 90834 PSYTX W PT 45 MINUTES: CPT | Mod: 95

## 2022-08-11 PROCEDURE — 99214 OFFICE O/P EST MOD 30 MIN: CPT | Mod: 95

## 2022-08-25 PROCEDURE — 90834 PSYTX W PT 45 MINUTES: CPT | Mod: 95

## 2022-09-08 PROCEDURE — 98968 PH1 ASSMT&MGMT NQHP 21-30: CPT | Mod: CR

## 2022-09-22 PROCEDURE — 90832 PSYTX W PT 30 MINUTES: CPT | Mod: 95

## 2022-10-06 PROCEDURE — 90832 PSYTX W PT 30 MINUTES: CPT | Mod: 95

## 2022-10-13 PROCEDURE — 99214 OFFICE O/P EST MOD 30 MIN: CPT | Mod: 95

## 2022-11-03 PROCEDURE — 90832 PSYTX W PT 30 MINUTES: CPT | Mod: 95

## 2022-11-17 PROCEDURE — 90832 PSYTX W PT 30 MINUTES: CPT | Mod: 95

## 2022-12-01 PROCEDURE — 90832 PSYTX W PT 30 MINUTES: CPT | Mod: 95

## 2022-12-15 PROCEDURE — 99214 OFFICE O/P EST MOD 30 MIN: CPT | Mod: 95

## 2022-12-15 PROCEDURE — 90832 PSYTX W PT 30 MINUTES: CPT | Mod: 95

## 2023-01-03 PROCEDURE — 98968 PH1 ASSMT&MGMT NQHP 21-30: CPT | Mod: CR

## 2023-02-14 PROCEDURE — 90832 PSYTX W PT 30 MINUTES: CPT | Mod: 95

## 2023-02-16 PROCEDURE — 99214 OFFICE O/P EST MOD 30 MIN: CPT | Mod: 95

## 2023-02-28 PROCEDURE — 90832 PSYTX W PT 30 MINUTES: CPT | Mod: 95

## 2023-03-06 ENCOUNTER — NON-APPOINTMENT (OUTPATIENT)
Age: 88
End: 2023-03-06

## 2023-03-06 ENCOUNTER — APPOINTMENT (OUTPATIENT)
Dept: FAMILY MEDICINE | Facility: CLINIC | Age: 88
End: 2023-03-06
Payer: MEDICARE

## 2023-03-06 VITALS — DIASTOLIC BLOOD PRESSURE: 80 MMHG | SYSTOLIC BLOOD PRESSURE: 140 MMHG

## 2023-03-06 VITALS
HEIGHT: 64 IN | HEART RATE: 93 BPM | TEMPERATURE: 96.5 F | OXYGEN SATURATION: 99 % | BODY MASS INDEX: 24.75 KG/M2 | RESPIRATION RATE: 17 BRPM | WEIGHT: 145 LBS

## 2023-03-06 DIAGNOSIS — M81.0 AGE-RELATED OSTEOPOROSIS W/OUT CURRENT PATHOLOGICAL FRACTURE: ICD-10-CM

## 2023-03-06 DIAGNOSIS — Z00.00 ENCOUNTER FOR GENERAL ADULT MEDICAL EXAMINATION W/OUT ABNORMAL FINDINGS: ICD-10-CM

## 2023-03-06 DIAGNOSIS — R60.9 EDEMA, UNSPECIFIED: ICD-10-CM

## 2023-03-06 DIAGNOSIS — M19.90 UNSPECIFIED OSTEOARTHRITIS, UNSPECIFIED SITE: ICD-10-CM

## 2023-03-06 DIAGNOSIS — Z82.69 FAMILY HISTORY OF OTHER DISEASES OF THE MUSCULOSKELETAL SYSTEM AND CONNECTIVE TISSUE: ICD-10-CM

## 2023-03-06 PROCEDURE — 99204 OFFICE O/P NEW MOD 45 MIN: CPT

## 2023-03-06 NOTE — HISTORY OF PRESENT ILLNESS
[FreeTextEntry8] : Ms Petra Lo is a 88 yo female presents today to establish care. Pt reports more that usual, leg swelling. Latest episode noted left leg swelling which has now resolved. Pt reports on/off pain in the sole of the left foot, arch of the foot. Pt also concerned for plantar fascitis. Pt also seeks evaluation by podiatrist. referral to be provided today. Pt reports in the past also seen vascular, Dr. Qiu. Pt recommended to follow up with vascular if noting worsening of swelling in lower extremities.

## 2023-03-06 NOTE — PHYSICAL EXAM
[No Acute Distress] : no acute distress [Well Nourished] : well nourished [EOMI] : extraocular movements intact [Supple] : supple [Clear to Auscultation] : lungs were clear to auscultation bilaterally [Normal S1, S2] : normal S1 and S2 [No Edema] : there was no peripheral edema [Soft] : abdomen soft [Non Tender] : non-tender [No Rash] : no rash [Normal Gait] : normal gait [Normal Affect] : the affect was normal

## 2023-03-06 NOTE — ASSESSMENT
[FreeTextEntry1] : Approximately  55 min of face to face time spent, reviewed chart, old labwork results, addressed various health concerns, ordered necessary new labs, imaging, referral for follow up. Answered all pt questions, coordinated care for patient.\par referral provided to podiatry/vascular provided\par rto in 1 month for bp recheck\par bring bp log from home

## 2023-03-14 PROCEDURE — 90834 PSYTX W PT 45 MINUTES: CPT | Mod: 95

## 2023-03-21 LAB
25(OH)D3 SERPL-MCNC: 51.8 NG/ML
ALBUMIN SERPL ELPH-MCNC: 4.5 G/DL
ALP BLD-CCNC: 74 U/L
ALT SERPL-CCNC: 22 U/L
ANA SER IF-ACNC: NEGATIVE
ANION GAP SERPL CALC-SCNC: 14 MMOL/L
APPEARANCE: CLEAR
AST SERPL-CCNC: 26 U/L
BASOPHILS # BLD AUTO: 0.08 K/UL
BASOPHILS NFR BLD AUTO: 1.4 %
BILIRUB SERPL-MCNC: 0.5 MG/DL
BILIRUBIN URINE: NEGATIVE
BLOOD URINE: NEGATIVE
BUN SERPL-MCNC: 15 MG/DL
CALCIUM SERPL-MCNC: 10.5 MG/DL
CHLORIDE SERPL-SCNC: 105 MMOL/L
CHOLEST SERPL-MCNC: 271 MG/DL
CO2 SERPL-SCNC: 25 MMOL/L
COLOR: NORMAL
CREAT SERPL-MCNC: 0.69 MG/DL
CRP SERPL-MCNC: 4 MG/L
DSDNA AB SER-ACNC: 17 IU/ML
EGFR: 83 ML/MIN/1.73M2
EOSINOPHIL # BLD AUTO: 0.14 K/UL
EOSINOPHIL NFR BLD AUTO: 2.5 %
ERYTHROCYTE [SEDIMENTATION RATE] IN BLOOD BY WESTERGREN METHOD: 48 MM/HR
ESTIMATED AVERAGE GLUCOSE: 117 MG/DL
FOLATE SERPL-MCNC: >20 NG/ML
GLUCOSE QUALITATIVE U: NEGATIVE
GLUCOSE SERPL-MCNC: 97 MG/DL
HBA1C MFR BLD HPLC: 5.7 %
HCT VFR BLD CALC: 37.9 %
HDLC SERPL-MCNC: 85 MG/DL
HGB BLD-MCNC: 12.2 G/DL
IMM GRANULOCYTES NFR BLD AUTO: 0.2 %
KETONES URINE: NEGATIVE
LDLC SERPL CALC-MCNC: 170 MG/DL
LEUKOCYTE ESTERASE URINE: NEGATIVE
LYMPHOCYTES # BLD AUTO: 2.6 K/UL
LYMPHOCYTES NFR BLD AUTO: 46.4 %
MAN DIFF?: NORMAL
MCHC RBC-ENTMCNC: 28.1 PG
MCHC RBC-ENTMCNC: 32.2 GM/DL
MCV RBC AUTO: 87.3 FL
MONOCYTES # BLD AUTO: 0.53 K/UL
MONOCYTES NFR BLD AUTO: 9.5 %
NEUTROPHILS # BLD AUTO: 2.24 K/UL
NEUTROPHILS NFR BLD AUTO: 40 %
NITRITE URINE: NEGATIVE
NONHDLC SERPL-MCNC: 186 MG/DL
NT-PROBNP SERPL-MCNC: 75 PG/ML
PH URINE: 6
PLATELET # BLD AUTO: 288 K/UL
POTASSIUM SERPL-SCNC: 4.2 MMOL/L
PROT SERPL-MCNC: 6.4 G/DL
PROTEIN URINE: NORMAL
RBC # BLD: 4.34 M/UL
RBC # FLD: 14.4 %
RHEUMATOID FACT SER QL: 11 IU/ML
SODIUM SERPL-SCNC: 144 MMOL/L
SPECIFIC GRAVITY URINE: 1.02
TRIGL SERPL-MCNC: 78 MG/DL
TSH SERPL-ACNC: 1.64 UIU/ML
URATE SERPL-MCNC: 4.7 MG/DL
UROBILINOGEN URINE: NORMAL
VIT B12 SERPL-MCNC: 788 PG/ML
WBC # FLD AUTO: 5.6 K/UL

## 2023-03-28 PROCEDURE — 90832 PSYTX W PT 30 MINUTES: CPT | Mod: 95

## 2023-04-15 ENCOUNTER — NON-APPOINTMENT (OUTPATIENT)
Age: 88
End: 2023-04-15

## 2023-04-24 ENCOUNTER — APPOINTMENT (OUTPATIENT)
Dept: INTERNAL MEDICINE | Facility: CLINIC | Age: 88
End: 2023-04-24

## 2023-04-25 PROCEDURE — 90832 PSYTX W PT 30 MINUTES: CPT | Mod: 95

## 2023-05-09 PROCEDURE — 90832 PSYTX W PT 30 MINUTES: CPT | Mod: 95

## 2023-05-15 PROCEDURE — 99214 OFFICE O/P EST MOD 30 MIN: CPT | Mod: 95

## 2023-05-23 ENCOUNTER — APPOINTMENT (OUTPATIENT)
Dept: RHEUMATOLOGY | Facility: CLINIC | Age: 88
End: 2023-05-23
Payer: MEDICARE

## 2023-05-23 VITALS
HEART RATE: 76 BPM | OXYGEN SATURATION: 98 % | SYSTOLIC BLOOD PRESSURE: 130 MMHG | WEIGHT: 143 LBS | TEMPERATURE: 97.1 F | BODY MASS INDEX: 24.41 KG/M2 | HEIGHT: 64 IN | RESPIRATION RATE: 15 BRPM | DIASTOLIC BLOOD PRESSURE: 80 MMHG

## 2023-05-23 DIAGNOSIS — M15.1 HEBERDEN'S NODES (WITH ARTHROPATHY): ICD-10-CM

## 2023-05-23 DIAGNOSIS — R70.0 ELEVATED ERYTHROCYTE SEDIMENTATION RATE: ICD-10-CM

## 2023-05-23 DIAGNOSIS — M79.672 PAIN IN LEFT FOOT: ICD-10-CM

## 2023-05-23 DIAGNOSIS — R60.0 LOCALIZED EDEMA: ICD-10-CM

## 2023-05-23 PROCEDURE — 99204 OFFICE O/P NEW MOD 45 MIN: CPT

## 2023-05-24 PROBLEM — M15.1 HEBERDEN'S NODES: Status: ACTIVE | Noted: 2023-05-24

## 2023-05-24 NOTE — PHYSICAL EXAM
[General Appearance - Alert] : alert [General Appearance - In No Acute Distress] : in no acute distress [General Appearance - Well Nourished] : well nourished [Oriented To Time, Place, And Person] : oriented to person, place, and time [Impaired Insight] : insight and judgment were intact [Affect] : the affect was normal [No Spinal Tenderness] : no spinal tenderness [Abnormal Walk] : normal gait [Nail Clubbing] : no clubbing  or cyanosis of the fingernails [Musculoskeletal - Swelling] : no joint swelling seen [Motor Tone] : muscle strength and tone were normal [No Focal Deficits] : no focal deficits [Sclera] : the sclera and conjunctiva were normal [PERRL With Normal Accommodation] : pupils were equal in size, round, and reactive to light [Extraocular Movements] : extraocular movements were intact [Oropharynx] : the oropharynx was normal [Neck Appearance] : the appearance of the neck was normal [] : no respiratory distress [Auscultation Breath Sounds / Voice Sounds] : lungs were clear to auscultation bilaterally [Heart Rate And Rhythm] : heart rate was normal and rhythm regular [Heart Sounds] : normal S1 and S2 [Murmurs] : no murmurs [FreeTextEntry1] : Poor memory

## 2023-05-24 NOTE — ASSESSMENT
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

## 2023-05-24 NOTE — HISTORY OF PRESENT ILLNESS
[FreeTextEntry1] : SUKUMAR MOSQUERA is a 89 year old woman who presents with -- Pt is a poor historian. \par \par + LLE edema in foot and up to midcalf, happens every year around the same time then self resolves, occasional pain but doesn't limit ADLs and not always painful when swollen, has had prior vascular w/u and dopplers which have been negative. ?association with salt intake and prolonged walking \par + R hand DIPs nodules, painless, not limiting use, + sisters with same \par + 1-2 self limited episodes of hand swelling but none in last 12-18 months that pt can recall \par + ESR checked in setting of above \par \par SLE ROS negative for focal alopecia, sicca, salivary gland swelling, oral ulcers, malar rash, photosensitivity, SOB, chest pain, serositis, abd pain, dysuria, hematuria, rash, joint AM stiffness/synovitis, hematologic abnormalities, Raynauds. \par \par Inflammatory arthritis ROS negative for symmetrical peripheral joint synovitis, prolonged AM stiffness, enthesitis, dactylitis, psoriasis/ rashes, eye inflammation, inflammatory low back pain, IBD. \par \par PMR/GCA ROS also negative. \par \par Labs - Negative SHASHI/dsDNA, RF \par Negative FH for autoimmune d/o

## 2023-05-24 NOTE — REVIEW OF SYSTEMS
[Negative] : Heme/Lymph [Lower Ext Edema] : lower extremity edema [As Noted in HPI] : as noted in HPI [Arthralgias] : arthralgias [Joint Pain] : joint pain [Joint Swelling] : no joint swelling [Joint Stiffness] : no joint stiffness

## 2023-05-31 ENCOUNTER — NON-APPOINTMENT (OUTPATIENT)
Age: 88
End: 2023-05-31

## 2023-06-01 ENCOUNTER — EMERGENCY (EMERGENCY)
Facility: HOSPITAL | Age: 88
LOS: 1 days | Discharge: HOME | End: 2023-06-01
Attending: EMERGENCY MEDICINE | Admitting: EMERGENCY MEDICINE
Payer: MEDICARE

## 2023-06-01 VITALS
RESPIRATION RATE: 18 BRPM | WEIGHT: 145.06 LBS | OXYGEN SATURATION: 96 % | TEMPERATURE: 98 F | DIASTOLIC BLOOD PRESSURE: 100 MMHG | HEART RATE: 85 BPM | HEIGHT: 64 IN | SYSTOLIC BLOOD PRESSURE: 182 MMHG

## 2023-06-01 VITALS
OXYGEN SATURATION: 98 % | RESPIRATION RATE: 18 BRPM | HEART RATE: 80 BPM | DIASTOLIC BLOOD PRESSURE: 90 MMHG | SYSTOLIC BLOOD PRESSURE: 155 MMHG

## 2023-06-01 DIAGNOSIS — Z90.49 ACQUIRED ABSENCE OF OTHER SPECIFIED PARTS OF DIGESTIVE TRACT: Chronic | ICD-10-CM

## 2023-06-01 DIAGNOSIS — Z98.890 OTHER SPECIFIED POSTPROCEDURAL STATES: Chronic | ICD-10-CM

## 2023-06-01 DIAGNOSIS — Z98.42 CATARACT EXTRACTION STATUS, LEFT EYE: Chronic | ICD-10-CM

## 2023-06-01 LAB
ALBUMIN SERPL ELPH-MCNC: 3.8 G/DL — SIGNIFICANT CHANGE UP (ref 3.3–5)
ALP SERPL-CCNC: 75 U/L — SIGNIFICANT CHANGE UP (ref 40–120)
ALT FLD-CCNC: 28 U/L — SIGNIFICANT CHANGE UP (ref 10–45)
ANION GAP SERPL CALC-SCNC: 9 MMOL/L — SIGNIFICANT CHANGE UP (ref 5–17)
APTT BLD: 27.6 SEC — SIGNIFICANT CHANGE UP (ref 27.5–35.5)
AST SERPL-CCNC: 23 U/L — SIGNIFICANT CHANGE UP (ref 10–40)
BASOPHILS # BLD AUTO: 0.06 K/UL — SIGNIFICANT CHANGE UP (ref 0–0.2)
BASOPHILS NFR BLD AUTO: 1 % — SIGNIFICANT CHANGE UP (ref 0–2)
BILIRUB SERPL-MCNC: 0.3 MG/DL — SIGNIFICANT CHANGE UP (ref 0.2–1.2)
BUN SERPL-MCNC: 17 MG/DL — SIGNIFICANT CHANGE UP (ref 7–23)
CALCIUM SERPL-MCNC: 10.3 MG/DL — SIGNIFICANT CHANGE UP (ref 8.4–10.5)
CHLORIDE SERPL-SCNC: 105 MMOL/L — SIGNIFICANT CHANGE UP (ref 96–108)
CO2 SERPL-SCNC: 28 MMOL/L — SIGNIFICANT CHANGE UP (ref 22–31)
CREAT SERPL-MCNC: 0.76 MG/DL — SIGNIFICANT CHANGE UP (ref 0.5–1.3)
EGFR: 75 ML/MIN/1.73M2 — SIGNIFICANT CHANGE UP
EOSINOPHIL # BLD AUTO: 0.06 K/UL — SIGNIFICANT CHANGE UP (ref 0–0.5)
EOSINOPHIL NFR BLD AUTO: 1 % — SIGNIFICANT CHANGE UP (ref 0–6)
FLUAV AG NPH QL: SIGNIFICANT CHANGE UP
FLUBV AG NPH QL: SIGNIFICANT CHANGE UP
GLUCOSE SERPL-MCNC: 99 MG/DL — SIGNIFICANT CHANGE UP (ref 70–99)
HCT VFR BLD CALC: 38.8 % — SIGNIFICANT CHANGE UP (ref 34.5–45)
HGB BLD-MCNC: 12.9 G/DL — SIGNIFICANT CHANGE UP (ref 11.5–15.5)
IMM GRANULOCYTES NFR BLD AUTO: 0.5 % — SIGNIFICANT CHANGE UP (ref 0–0.9)
INR BLD: 1.02 RATIO — SIGNIFICANT CHANGE UP (ref 0.88–1.16)
LIDOCAIN IGE QN: 46 U/L — LOW (ref 73–393)
LYMPHOCYTES # BLD AUTO: 2.14 K/UL — SIGNIFICANT CHANGE UP (ref 1–3.3)
LYMPHOCYTES # BLD AUTO: 34.7 % — SIGNIFICANT CHANGE UP (ref 13–44)
MAGNESIUM SERPL-MCNC: 1.9 MG/DL — SIGNIFICANT CHANGE UP (ref 1.6–2.6)
MCHC RBC-ENTMCNC: 29 PG — SIGNIFICANT CHANGE UP (ref 27–34)
MCHC RBC-ENTMCNC: 33.2 GM/DL — SIGNIFICANT CHANGE UP (ref 32–36)
MCV RBC AUTO: 87.2 FL — SIGNIFICANT CHANGE UP (ref 80–100)
MONOCYTES # BLD AUTO: 0.34 K/UL — SIGNIFICANT CHANGE UP (ref 0–0.9)
MONOCYTES NFR BLD AUTO: 5.5 % — SIGNIFICANT CHANGE UP (ref 2–14)
NEUTROPHILS # BLD AUTO: 3.53 K/UL — SIGNIFICANT CHANGE UP (ref 1.8–7.4)
NEUTROPHILS NFR BLD AUTO: 57.3 % — SIGNIFICANT CHANGE UP (ref 43–77)
NRBC # BLD: 0 /100 WBCS — SIGNIFICANT CHANGE UP (ref 0–0)
NT-PROBNP SERPL-SCNC: 167 PG/ML — SIGNIFICANT CHANGE UP (ref 0–300)
PLATELET # BLD AUTO: 273 K/UL — SIGNIFICANT CHANGE UP (ref 150–400)
POTASSIUM SERPL-MCNC: 3.4 MMOL/L — LOW (ref 3.5–5.3)
POTASSIUM SERPL-SCNC: 3.4 MMOL/L — LOW (ref 3.5–5.3)
PROT SERPL-MCNC: 7.2 G/DL — SIGNIFICANT CHANGE UP (ref 6–8.3)
PROTHROM AB SERPL-ACNC: 11.8 SEC — SIGNIFICANT CHANGE UP (ref 10.5–13.4)
RBC # BLD: 4.45 M/UL — SIGNIFICANT CHANGE UP (ref 3.8–5.2)
RBC # FLD: 13.3 % — SIGNIFICANT CHANGE UP (ref 10.3–14.5)
RSV RNA NPH QL NAA+NON-PROBE: SIGNIFICANT CHANGE UP
SARS-COV-2 RNA SPEC QL NAA+PROBE: SIGNIFICANT CHANGE UP
SODIUM SERPL-SCNC: 142 MMOL/L — SIGNIFICANT CHANGE UP (ref 135–145)
TROPONIN I, HIGH SENSITIVITY RESULT: 5.1 NG/L — SIGNIFICANT CHANGE UP
TROPONIN I, HIGH SENSITIVITY RESULT: 5.6 NG/L — SIGNIFICANT CHANGE UP
WBC # BLD: 6.16 K/UL — SIGNIFICANT CHANGE UP (ref 3.8–10.5)
WBC # FLD AUTO: 6.16 K/UL — SIGNIFICANT CHANGE UP (ref 3.8–10.5)

## 2023-06-01 PROCEDURE — 93010 ELECTROCARDIOGRAM REPORT: CPT

## 2023-06-01 PROCEDURE — 84484 ASSAY OF TROPONIN QUANT: CPT

## 2023-06-01 PROCEDURE — 71045 X-RAY EXAM CHEST 1 VIEW: CPT

## 2023-06-01 PROCEDURE — 71045 X-RAY EXAM CHEST 1 VIEW: CPT | Mod: 26

## 2023-06-01 PROCEDURE — 85610 PROTHROMBIN TIME: CPT

## 2023-06-01 PROCEDURE — 83880 ASSAY OF NATRIURETIC PEPTIDE: CPT

## 2023-06-01 PROCEDURE — 36415 COLL VENOUS BLD VENIPUNCTURE: CPT

## 2023-06-01 PROCEDURE — 83735 ASSAY OF MAGNESIUM: CPT

## 2023-06-01 PROCEDURE — 93005 ELECTROCARDIOGRAM TRACING: CPT

## 2023-06-01 PROCEDURE — 99285 EMERGENCY DEPT VISIT HI MDM: CPT | Mod: 25

## 2023-06-01 PROCEDURE — 83690 ASSAY OF LIPASE: CPT

## 2023-06-01 PROCEDURE — 99285 EMERGENCY DEPT VISIT HI MDM: CPT | Mod: FS

## 2023-06-01 PROCEDURE — 85025 COMPLETE CBC W/AUTO DIFF WBC: CPT

## 2023-06-01 PROCEDURE — 80053 COMPREHEN METABOLIC PANEL: CPT

## 2023-06-01 PROCEDURE — 85730 THROMBOPLASTIN TIME PARTIAL: CPT

## 2023-06-01 PROCEDURE — 87637 SARSCOV2&INF A&B&RSV AMP PRB: CPT

## 2023-06-01 RX ORDER — MIRTAZAPINE 45 MG/1
1 TABLET, ORALLY DISINTEGRATING ORAL
Qty: 0 | Refills: 0 | DISCHARGE

## 2023-06-01 RX ORDER — PANTOPRAZOLE SODIUM 20 MG/1
1 TABLET, DELAYED RELEASE ORAL
Qty: 0 | Refills: 0 | DISCHARGE

## 2023-06-01 RX ORDER — ASPIRIN/CALCIUM CARB/MAGNESIUM 324 MG
0 TABLET ORAL
Qty: 0 | Refills: 0 | DISCHARGE

## 2023-06-01 NOTE — ED PROVIDER NOTE - NSICDXPASTSURGICALHX_GEN_ALL_CORE_FT
PAST SURGICAL HISTORY:  Cataract extraction status of eye, left     History of cholecystectomy     History of melanoma excision right lower extremity

## 2023-06-01 NOTE — ED PROVIDER NOTE - ATTENDING APP SHARED VISIT CONTRIBUTION OF CARE
Loni with EMELI Liu. Patient 88 yo f hx HTN on metoprolol no AC c/o intermittent chest discomfort started Sunday and one today. states it felt like a strange sensation and then she became anxious. Patient denies SOB, headache, dizziness and blurry vision, n/v/d, fever, cough.  labs unremarkable. trop neg and stable. pt remained asymptomatic.   Appt made for pt.   Discussed with patient need to return to ED if symptoms don't continue to improve or recur or develops any new or worsening symptoms that are of concern.    I performed a face to face bedside interview with patient regarding history of present illness, review of symptoms and past medical history. I completed an independent physical exam.  I have discussed the patient's plan of care with Physician Assistant (PA). I agree with note as stated above, having amended the EMR as needed to reflect my findings.   This includes History of Present Illness, HIV, Past Medical/Surgical/Family/Social History, Allergies and Home Medications, Review of Systems, Physical Exam, and any Progress Notes during the time I functioned as the attending physician for this patient.

## 2023-06-01 NOTE — ED PROVIDER NOTE - PATIENT PORTAL LINK FT
You can access the FollowMyHealth Patient Portal offered by [facility] by registering at the following website: [email or web address]. By joining [facility]’s FollowMyHealth portal, you will also be able to view your health information using other applications (apps) compatible with our system.

## 2023-06-01 NOTE — ED PROVIDER NOTE - NSFOLLOWUPINSTRUCTIONS_ED_ALL_ED_FT
Chest Pain    Chest pain can be caused by many different conditions which may or may not be dangerous. Causes include heartburn, lung infections, heart attack, blood clot in lungs, skin infections, strain or damage to muscle, cartilage, or bones, etc. In addition to a history and physical examination, an electrocardiogram (ECG) or other lab tests may have been performed to determine the cause of your chest pain. Follow up with your primary care provider or with a cardiologist as instructed.     Follow up with a cardiologist. We recommend holter monitoring based on your history of present complaint. See attached for results. We have arranged your appointment for follow up.     SEEK IMMEDIATE MEDICAL CARE IF YOU HAVE ANY OF THE FOLLOWING SYMPTOMS: worsening chest pain, coughing up blood, unexplained back/neck/jaw pain, severe abdominal pain, dizziness or lightheadedness, fainting, shortness of breath, sweaty or clammy skin, vomiting, or racing heart beat. These symptoms may represent a serious problem that is an emergency. Do not wait to see if the symptoms will go away. Get medical help right away. Call 911 and do not drive yourself to the hospital.

## 2023-06-01 NOTE — ED ADULT NURSE NOTE - NSFALLHARMRISKINTERV_ED_ALL_ED

## 2023-06-01 NOTE — ED ADULT NURSE NOTE - CHIEF COMPLAINT QUOTE
Pt c/o intermittent chest discomfort started Sunday and today, sent in from the Urgent Care, no pain at this time, took 2 baby ASA 11 am

## 2023-06-01 NOTE — ED PROVIDER NOTE - CLINICAL SUMMARY MEDICAL DECISION MAKING FREE TEXT BOX
Patient 89 yo f hx HTN on metoprolol no AC c/o intermittent chest discomfort started Sunday and one today. states it felt like a strange sensation and then she became anxious. Patient denies SOB, headache, dizziness and blurry vision, n/v/d, fever, cough.  labs unremarkable. trops neg delta. pt remained asymptomatic  appts made for pt  Discussed with patient need to return to ED if symptoms don't continue to improve or recur or develops any new or worsening symptoms that are of concern. Patient 89 yo f hx HTN on metoprolol no AC c/o intermittent chest discomfort started Sunday and one today. states it felt like a strange sensation and then she became anxious. Patient denies SOB, headache, dizziness and blurry vision, n/v/d, fever, cough.  labs unremarkable. trops neg and stable. pt remained asymptomatic.   Appt made for pt.   Discussed with patient need to return to ED if symptoms don't continue to improve or recur or develops any new or worsening symptoms that are of concern.

## 2023-06-01 NOTE — ED PROVIDER NOTE - CARE PROVIDER_API CALL
Rasheed Bradford  Cardiology  [address], [address]  [place] [address]  Phone: [phone]  Fax: [phone]  Follow Up Time:

## 2023-06-01 NOTE — ED PROVIDER NOTE - PHYSICAL EXAMINATION
General:     NAD, well-nourished, well-appearing  Eyes: PERRL  Head:     NC/AT, oral mucosa moist  Skin: no rash  Lungs:     CTA b/l  CVS:     RRR  Abd:     soft, nt  Ext:   no deformities   Neuro: AAOx3

## 2023-06-01 NOTE — ED ADULT NURSE NOTE - OBJECTIVE STATEMENT
Patient c/o intermittent chest discomfort started Sunday and today. Patient denies SOB, headache, dizziness and blurry vision.

## 2023-06-01 NOTE — ED PROVIDER NOTE - OBJECTIVE STATEMENT
Patient 88 yo f hx HTN on metoprolol no AC c/o intermittent chest discomfort started Sunday and one today. states it felt like a strange sensation and then she became anxious. Patient denies SOB, headache, dizziness and blurry vision, n/v/d, fever, cough.

## 2023-06-01 NOTE — ED ADULT TRIAGE NOTE - CHIEF COMPLAINT QUOTE
Pt c/o intermittent chest discomfort started Sunday and today, sent in from the Urgent Care, no pain at this time Pt c/o intermittent chest discomfort started Sunday and today, sent in from the Urgent Care, no pain at this time, took 2 baby ASA 11 am

## 2023-06-05 NOTE — CHART NOTE - NSCHARTNOTEFT_GEN_A_CORE
As per [facility], patient has scheduled cardiology appointment on 6/16/23 with Dr Mcguire.

## 2023-06-06 PROCEDURE — 90832 PSYTX W PT 30 MINUTES: CPT | Mod: 95

## 2023-06-16 ENCOUNTER — APPOINTMENT (OUTPATIENT)
Dept: CARDIOLOGY | Facility: CLINIC | Age: 88
End: 2023-06-16
Payer: MEDICARE

## 2023-06-16 ENCOUNTER — NON-APPOINTMENT (OUTPATIENT)
Age: 88
End: 2023-06-16

## 2023-06-16 VITALS
BODY MASS INDEX: 24.07 KG/M2 | OXYGEN SATURATION: 98 % | HEART RATE: 71 BPM | WEIGHT: 141 LBS | DIASTOLIC BLOOD PRESSURE: 89 MMHG | SYSTOLIC BLOOD PRESSURE: 160 MMHG | HEIGHT: 64 IN

## 2023-06-16 VITALS — SYSTOLIC BLOOD PRESSURE: 162 MMHG | DIASTOLIC BLOOD PRESSURE: 90 MMHG

## 2023-06-16 DIAGNOSIS — Z87.891 PERSONAL HISTORY OF NICOTINE DEPENDENCE: ICD-10-CM

## 2023-06-16 PROCEDURE — 99204 OFFICE O/P NEW MOD 45 MIN: CPT

## 2023-06-16 PROCEDURE — 93000 ELECTROCARDIOGRAM COMPLETE: CPT

## 2023-06-16 NOTE — CARDIOLOGY SUMMARY
[de-identified] : June 1, 2023 sinus rhythm voltage criteria for LVH [de-identified] : 2011,12  normal LVEF mild MR

## 2023-06-16 NOTE — DISCUSSION/SUMMARY
[FreeTextEntry1] : Discussed with patient and her friend were present in detail suggested echo and pharmacologic nuclear stress echo back to ER for significant recurrent discomfort particularly if they last more than a minute or 2\par Maintain beta-blocker for now May need specific antihypertensive therapy follow-up visit after above.  Questions addressed with her. [EKG obtained to assist in diagnosis and management of assessed problem(s)] : EKG obtained to assist in diagnosis and management of assessed problem(s)

## 2023-06-16 NOTE — ASSESSMENT
[FreeTextEntry1] : Chest discomfort suspect GI rather than cardiac as occurs at rest when supine not with exertion.  Elevated blood pressure.

## 2023-06-16 NOTE — HISTORY OF PRESENT ILLNESS
[FreeTextEntry1] : 89-year-old woman referred after ER and walk-in clinic visits.  She indicates that she gets a "aura" feels hot and will feel a discomfort in the upper chest lower throat region that lasts about 30 seconds with a pressure or discomfort.  This is happened twice if she recalls.  It is happening only when lying down.  There is no associated nausea or vomiting diaphoresis or shortness of breath.\par \par She has no personal history of coronary disease.  She does not walk much but has no discomfort when active physically.  She has no history of angina.

## 2023-06-22 ENCOUNTER — APPOINTMENT (OUTPATIENT)
Dept: CARDIOLOGY | Facility: CLINIC | Age: 88
End: 2023-06-22
Payer: MEDICARE

## 2023-06-22 PROCEDURE — 93306 TTE W/DOPPLER COMPLETE: CPT

## 2023-07-05 ENCOUNTER — APPOINTMENT (OUTPATIENT)
Dept: FAMILY MEDICINE | Facility: CLINIC | Age: 88
End: 2023-07-05

## 2023-07-08 ENCOUNTER — NON-APPOINTMENT (OUTPATIENT)
Age: 88
End: 2023-07-08

## 2023-07-11 PROCEDURE — 99214 OFFICE O/P EST MOD 30 MIN: CPT | Mod: 95

## 2023-07-15 ENCOUNTER — NON-APPOINTMENT (OUTPATIENT)
Age: 88
End: 2023-07-15

## 2023-07-20 ENCOUNTER — APPOINTMENT (OUTPATIENT)
Dept: CARDIOLOGY | Facility: CLINIC | Age: 88
End: 2023-07-20

## 2023-07-28 ENCOUNTER — APPOINTMENT (OUTPATIENT)
Dept: CARDIOLOGY | Facility: CLINIC | Age: 88
End: 2023-07-28
Payer: MEDICARE

## 2023-07-28 VITALS
HEIGHT: 64 IN | DIASTOLIC BLOOD PRESSURE: 82 MMHG | BODY MASS INDEX: 23.9 KG/M2 | SYSTOLIC BLOOD PRESSURE: 164 MMHG | HEART RATE: 72 BPM | WEIGHT: 140 LBS | OXYGEN SATURATION: 97 %

## 2023-07-28 VITALS — DIASTOLIC BLOOD PRESSURE: 86 MMHG | SYSTOLIC BLOOD PRESSURE: 146 MMHG

## 2023-07-28 DIAGNOSIS — R07.9 CHEST PAIN, UNSPECIFIED: ICD-10-CM

## 2023-07-28 DIAGNOSIS — I10 ESSENTIAL (PRIMARY) HYPERTENSION: ICD-10-CM

## 2023-07-28 PROCEDURE — 99214 OFFICE O/P EST MOD 30 MIN: CPT

## 2023-07-28 NOTE — CARDIOLOGY SUMMARY
[de-identified] : June 1, 2023 sinus rhythm voltage criteria for LVH [de-identified] : 2011,12  normal LVEF mild MR\par 2023 normal LVEF

## 2023-07-28 NOTE — HISTORY OF PRESENT ILLNESS
[FreeTextEntry1] : Seen with her aide.  Overall feels well but when under stress will feel a heaviness across her chest that goes away on its own.  She declines to do stress testing.  She had echocardiography which was benign.  Does not check blood pressure at home.  Reports no change in medication

## 2023-07-28 NOTE — ASSESSMENT
[FreeTextEntry1] : Elevated blood pressure with whitecoat component.  Anxiety.  Emotionally driven chest discomfort possible CAD

## 2023-07-28 NOTE — DISCUSSION/SUMMARY
[___ Month(s)] : in [unfilled] month(s) [FreeTextEntry1] : She is still declining stress testing although recommended to her again.  She will consider.  Discussed regimen questions addressed.  Follow-up with her PMD see me as needed or in 6 months

## 2023-08-29 NOTE — ED ADULT NURSE NOTE - NS ED NURSE LEVEL OF CONSCIOUSNESS ORIENTATION
Age appropriate behavior/Oriented - self; Oriented - place; Oriented - time Mixed Nodular And Micronodular Bcc Histology Text: There were aggregates of basaloid cells in a nodular and micro nodular pattern.

## 2023-10-04 PROCEDURE — 99214 OFFICE O/P EST MOD 30 MIN: CPT | Mod: 95

## 2023-12-06 ENCOUNTER — APPOINTMENT (OUTPATIENT)
Dept: MAMMOGRAPHY | Facility: HOSPITAL | Age: 88
End: 2023-12-06
Payer: MEDICARE

## 2023-12-06 ENCOUNTER — OUTPATIENT (OUTPATIENT)
Dept: OUTPATIENT SERVICES | Facility: HOSPITAL | Age: 88
LOS: 1 days | End: 2023-12-06
Payer: MEDICARE

## 2023-12-06 DIAGNOSIS — Z98.890 OTHER SPECIFIED POSTPROCEDURAL STATES: Chronic | ICD-10-CM

## 2023-12-06 DIAGNOSIS — Z98.42 CATARACT EXTRACTION STATUS, LEFT EYE: Chronic | ICD-10-CM

## 2023-12-06 DIAGNOSIS — R92.8 OTHER ABNORMAL AND INCONCLUSIVE FINDINGS ON DIAGNOSTIC IMAGING OF BREAST: ICD-10-CM

## 2023-12-06 DIAGNOSIS — Z90.49 ACQUIRED ABSENCE OF OTHER SPECIFIED PARTS OF DIGESTIVE TRACT: Chronic | ICD-10-CM

## 2023-12-06 PROCEDURE — 77067 SCR MAMMO BI INCL CAD: CPT | Mod: 26

## 2023-12-06 PROCEDURE — 77067 SCR MAMMO BI INCL CAD: CPT

## 2023-12-06 PROCEDURE — 77063 BREAST TOMOSYNTHESIS BI: CPT | Mod: 26

## 2023-12-06 PROCEDURE — 77063 BREAST TOMOSYNTHESIS BI: CPT

## 2023-12-26 PROCEDURE — 99214 OFFICE O/P EST MOD 30 MIN: CPT | Mod: 95

## 2024-12-06 ENCOUNTER — APPOINTMENT (OUTPATIENT)
Dept: GASTROENTEROLOGY | Facility: CLINIC | Age: 88
End: 2024-12-06
Payer: MEDICARE

## 2024-12-06 VITALS
BODY MASS INDEX: 22.53 KG/M2 | HEIGHT: 64 IN | HEART RATE: 68 BPM | RESPIRATION RATE: 16 BRPM | DIASTOLIC BLOOD PRESSURE: 91 MMHG | WEIGHT: 132 LBS | SYSTOLIC BLOOD PRESSURE: 161 MMHG | TEMPERATURE: 97.1 F | OXYGEN SATURATION: 98 %

## 2024-12-06 DIAGNOSIS — K58.2 MIXED IRRITABLE BOWEL SYNDROME: ICD-10-CM

## 2024-12-06 DIAGNOSIS — R19.7 DIARRHEA, UNSPECIFIED: ICD-10-CM

## 2024-12-06 PROCEDURE — 99204 OFFICE O/P NEW MOD 45 MIN: CPT

## 2024-12-06 RX ORDER — MIRTAZAPINE 15 MG/1
15 TABLET, FILM COATED ORAL
Refills: 0 | Status: ACTIVE | COMMUNITY

## 2025-04-18 ENCOUNTER — APPOINTMENT (OUTPATIENT)
Dept: GASTROENTEROLOGY | Facility: CLINIC | Age: 89
End: 2025-04-18

## 2025-06-13 NOTE — ASU PREOP CHECKLIST - BETA DATE TIME
Detail Level: Zone
General Sunscreen Counseling: I recommended a broad spectrum sunscreen with a SPF of 30 or higher.  I explained that SPF 30 sunscreens block approximately 97 percent of the sun's harmful rays.  Sunscreens should be applied at least 15 minutes prior to expected sun exposure and then every 2 hours after that as long as sun exposure continues. If swimming or exercising sunscreen should be reapplied every 45 minutes to an hour after getting wet or sweating.  One ounce, or the equivalent of a shot glass full of sunscreen, is adequate to protect the skin not covered by a bathing suit. I also recommended a lip balm with a sunscreen as well. Sun protective clothing can be used in lieu of sunscreen but must be worn the entire time you are exposed to the sun's rays. Recommend sunscreen with zinc oxide and titanium.
Products Recommended: Patient educated to wear sun screen that contains zinc and titanium ingredients.
30-Nov-2021 21:00

## 2025-07-31 ENCOUNTER — APPOINTMENT (OUTPATIENT)
Dept: GASTROENTEROLOGY | Facility: CLINIC | Age: 89
End: 2025-07-31
Payer: MEDICARE

## 2025-07-31 VITALS
TEMPERATURE: 98.5 F | BODY MASS INDEX: 20.83 KG/M2 | OXYGEN SATURATION: 98 % | DIASTOLIC BLOOD PRESSURE: 84 MMHG | WEIGHT: 122 LBS | HEIGHT: 64 IN | RESPIRATION RATE: 16 BRPM | HEART RATE: 70 BPM | SYSTOLIC BLOOD PRESSURE: 155 MMHG

## 2025-07-31 DIAGNOSIS — K90.0 CELIAC DISEASE: ICD-10-CM

## 2025-07-31 DIAGNOSIS — K58.2 MIXED IRRITABLE BOWEL SYNDROME: ICD-10-CM

## 2025-07-31 DIAGNOSIS — R14.0 ABDOMINAL DISTENSION (GASEOUS): ICD-10-CM

## 2025-07-31 PROCEDURE — 99215 OFFICE O/P EST HI 40 MIN: CPT

## 2025-07-31 PROCEDURE — G2211 COMPLEX E/M VISIT ADD ON: CPT
